# Patient Record
Sex: FEMALE | Race: WHITE | NOT HISPANIC OR LATINO | Employment: FULL TIME
[De-identification: names, ages, dates, MRNs, and addresses within clinical notes are randomized per-mention and may not be internally consistent; named-entity substitution may affect disease eponyms.]

---

## 2018-02-25 ENCOUNTER — HEALTH MAINTENANCE LETTER (OUTPATIENT)
Age: 38
End: 2018-02-25

## 2020-03-02 ENCOUNTER — HEALTH MAINTENANCE LETTER (OUTPATIENT)
Age: 40
End: 2020-03-02

## 2020-12-14 ENCOUNTER — HEALTH MAINTENANCE LETTER (OUTPATIENT)
Age: 40
End: 2020-12-14

## 2021-02-12 ASSESSMENT — ANXIETY QUESTIONNAIRES
5. BEING SO RESTLESS THAT IT IS HARD TO SIT STILL: NOT AT ALL
7. FEELING AFRAID AS IF SOMETHING AWFUL MIGHT HAPPEN: NOT AT ALL
3. WORRYING TOO MUCH ABOUT DIFFERENT THINGS: SEVERAL DAYS
GAD7 TOTAL SCORE: 3
4. TROUBLE RELAXING: SEVERAL DAYS
7. FEELING AFRAID AS IF SOMETHING AWFUL MIGHT HAPPEN: NOT AT ALL
2. NOT BEING ABLE TO STOP OR CONTROL WORRYING: NOT AT ALL
6. BECOMING EASILY ANNOYED OR IRRITABLE: SEVERAL DAYS
1. FEELING NERVOUS, ANXIOUS, OR ON EDGE: NOT AT ALL
GAD7 TOTAL SCORE: 3

## 2021-02-13 ASSESSMENT — ANXIETY QUESTIONNAIRES: GAD7 TOTAL SCORE: 3

## 2021-02-16 ENCOUNTER — OFFICE VISIT (OUTPATIENT)
Dept: OBGYN | Facility: CLINIC | Age: 41
End: 2021-02-16
Attending: NURSE PRACTITIONER
Payer: COMMERCIAL

## 2021-02-16 ENCOUNTER — ANCILLARY PROCEDURE (OUTPATIENT)
Dept: MAMMOGRAPHY | Facility: CLINIC | Age: 41
End: 2021-02-16
Attending: NURSE PRACTITIONER
Payer: COMMERCIAL

## 2021-02-16 VITALS
BODY MASS INDEX: 24.19 KG/M2 | SYSTOLIC BLOOD PRESSURE: 134 MMHG | WEIGHT: 145.2 LBS | HEART RATE: 71 BPM | HEIGHT: 65 IN | DIASTOLIC BLOOD PRESSURE: 83 MMHG

## 2021-02-16 DIAGNOSIS — Z13.220 SCREENING FOR LIPOID DISORDERS: ICD-10-CM

## 2021-02-16 DIAGNOSIS — Z30.431 INTRAUTERINE DEVICE SURVEILLANCE: ICD-10-CM

## 2021-02-16 DIAGNOSIS — Z12.31 VISIT FOR SCREENING MAMMOGRAM: ICD-10-CM

## 2021-02-16 DIAGNOSIS — Z80.3 FAMILY HISTORY OF MALIGNANT NEOPLASM OF BREAST: ICD-10-CM

## 2021-02-16 DIAGNOSIS — Z00.00 VISIT FOR PREVENTIVE HEALTH EXAMINATION: Primary | ICD-10-CM

## 2021-02-16 DIAGNOSIS — Z12.31 ENCOUNTER FOR SCREENING MAMMOGRAM FOR MALIGNANT NEOPLASM OF BREAST: ICD-10-CM

## 2021-02-16 DIAGNOSIS — Z12.4 SCREENING FOR MALIGNANT NEOPLASM OF CERVIX: ICD-10-CM

## 2021-02-16 DIAGNOSIS — Z01.419 ENCOUNTER FOR GYNECOLOGICAL EXAMINATION WITHOUT ABNORMAL FINDING: ICD-10-CM

## 2021-02-16 PROCEDURE — 87624 HPV HI-RISK TYP POOLED RSLT: CPT | Performed by: NURSE PRACTITIONER

## 2021-02-16 PROCEDURE — 90686 IIV4 VACC NO PRSV 0.5 ML IM: CPT

## 2021-02-16 PROCEDURE — G0008 ADMIN INFLUENZA VIRUS VAC: HCPCS

## 2021-02-16 PROCEDURE — 77067 SCR MAMMO BI INCL CAD: CPT

## 2021-02-16 PROCEDURE — G0145 SCR C/V CYTO,THINLAYER,RESCR: HCPCS | Performed by: NURSE PRACTITIONER

## 2021-02-16 PROCEDURE — 99386 PREV VISIT NEW AGE 40-64: CPT | Performed by: NURSE PRACTITIONER

## 2021-02-16 PROCEDURE — 77067 SCR MAMMO BI INCL CAD: CPT | Mod: 26 | Performed by: RADIOLOGY

## 2021-02-16 PROCEDURE — 250N000011 HC RX IP 250 OP 636

## 2021-02-16 ASSESSMENT — ENCOUNTER SYMPTOMS
CHILLS: 0
INCREASED ENERGY: 0
HOT FLASHES: 0
HALLUCINATIONS: 0
SPUTUM PRODUCTION: 0
ARTHRALGIAS: 0
BREAST PAIN: 0
TROUBLE SWALLOWING: 0
FATIGUE: 0
WEIGHT GAIN: 0
WEIGHT LOSS: 0
BOWEL INCONTINENCE: 0
DOUBLE VISION: 0
SNORES LOUDLY: 0
EYE IRRITATION: 0
DIFFICULTY URINATING: 0
LEG PAIN: 0
BLOOD IN STOOL: 0
HEMATURIA: 0
POSTURAL DYSPNEA: 0
ABDOMINAL PAIN: 0
DECREASED CONCENTRATION: 0
POLYPHAGIA: 0
LIGHT-HEADEDNESS: 0
RECTAL BLEEDING: 0
EYE PAIN: 0
PANIC: 0
FEVER: 0
SORE THROAT: 0
RESPIRATORY PAIN: 0
EYE REDNESS: 0
LEG SWELLING: 0
NECK MASS: 0
DYSPNEA ON EXERTION: 0
NECK PAIN: 0
SINUS CONGESTION: 0
FLANK PAIN: 0
NAUSEA: 0
SPEECH CHANGE: 0
HOARSE VOICE: 0
HEARTBURN: 0
SMELL DISTURBANCE: 0
SKIN CHANGES: 0
DIARRHEA: 0
WEAKNESS: 0
WHEEZING: 0
LOSS OF CONSCIOUSNESS: 0
HEADACHES: 0
TREMORS: 0
MYALGIAS: 0
EXTREMITY NUMBNESS: 0
EYE WATERING: 0
CONSTIPATION: 0
DECREASED APPETITE: 0
ORTHOPNEA: 0
SHORTNESS OF BREATH: 0
COUGH DISTURBING SLEEP: 0
EXERCISE INTOLERANCE: 0
PARALYSIS: 0
CLAUDICATION: 0
TASTE DISTURBANCE: 0
HYPOTENSION: 0
HYPERTENSION: 0
SEIZURES: 0
SYNCOPE: 0
NUMBNESS: 0
POLYDIPSIA: 0
ALTERED TEMPERATURE REGULATION: 0
MUSCLE WEAKNESS: 0
HEMOPTYSIS: 0
SLEEP DISTURBANCES DUE TO BREATHING: 0
BACK PAIN: 0
STIFFNESS: 0
TINGLING: 0
COUGH: 0
MEMORY LOSS: 0
DEPRESSION: 0
BLOATING: 0
NIGHT SWEATS: 0
JAUNDICE: 0
DISTURBANCES IN COORDINATION: 0
JOINT SWELLING: 0
TACHYCARDIA: 0
VOMITING: 0
NAIL CHANGES: 0
INSOMNIA: 0
NERVOUS/ANXIOUS: 0
BREAST MASS: 0
SINUS PAIN: 0
PALPITATIONS: 0
DYSURIA: 0
DECREASED LIBIDO: 0
POOR WOUND HEALING: 0
DIZZINESS: 0
SWOLLEN GLANDS: 0
MUSCLE CRAMPS: 0
RECTAL PAIN: 0
BRUISES/BLEEDS EASILY: 0

## 2021-02-16 ASSESSMENT — PATIENT HEALTH QUESTIONNAIRE - PHQ9: SUM OF ALL RESPONSES TO PHQ QUESTIONS 1-9: 2

## 2021-02-16 ASSESSMENT — MIFFLIN-ST. JEOR: SCORE: 1319.5

## 2021-02-16 NOTE — PROGRESS NOTES
Annual visit : preventive health, check in.    Concerns: would like to stay on routine preventive health visit check-up.     Mammogram: would like to get it done today if appt available.   -mother diagnosed with breast cancer at the age of 50.   -aunt (father sister) had ovarian cancer stage 4    Curious about BRCA 1 & 2 genetic testing.   Medications: no medications. Drinks herbal tea.     Last pap: 12/22/2014?  Yes to doing pap smear     Declines STI testing.     IUD Mirena: 10/20/2016     Labs: lipids, thyroid, glucose?   -Doesn't like blood draws. Would be fine with doing it.     Sexual history: Sexually active. One partner in the last 10 years () male partner. No concerns with pain or changes in libido.     Social: , works from home. Lives with  and 2 children.     Diet: eats a lot of home, more starch than would like.  is  so eats balanced. Gets three serving of calcium.     Exercise: cross country skiinging, jog, walks dog 2x day.     Immunizations: unsure.   Flu shot: would like to get it today.

## 2021-02-16 NOTE — PATIENT INSTRUCTIONS

## 2021-02-16 NOTE — PROGRESS NOTES
Progress Note    SUBJECTIVE:  Nisreen Mohan is an 40 year old, , who requests an Annual Preventive Exam.     Concerns today include:   1. Preventative Health Visit: Nisreen had her last preventive exam in . She had her last cervical cancer screening (Pap Smear) on 2014 NIL, HPV negative with no history of abnormal pap smears. She reports she would like to have pap smear today.   She reports she has never had cholesterol level checked. She has a family history of cardiovascular disease (parents and grandparents). She is agreeable to having lipids checked today.     2. Mammogram: She has never had a mammogram. Her mother was diagnosed with breast cancer in her early 50s. She would like to have a mammogram today.     3. Genetic testing: Nisreen has a significant family history of cancer (mother- breast cancer; paternal aunt had stage 4 ovarian cancer, stage 4; father had prostate and rectal cancer).  She is interested in discussing BRCA 1&2 genetic testing    4. Sexual Health: Sexually active with 1 male partner in the last 10 yrs (). Denies sexual concerns.     Contraception: Mirena IUD placed placed 10/20/2016. Reports satisfaction with this form of contracpetion. Dos not desire to have any more children.     Social: works as an ; currently works from home. Lives with  and 2 children, ages 4 and 8.     Diet: eats balanced, home cooked meals.  is a . Gets three serving of calcium, mostly cheese.    Exercise: reports she is very active; cross country skiinging, jogs, walks dog 2x day.       Menstrual History:  Menstrual History 2016   LAST MENSTRUAL PERIOD 2015 - 2021   Menarche Age - 12 -   Period Cycle (Days) - 28-30 -   Period Duration (Days) - after copper iud  4days cycle -   Method of Contraception - - -   Period Pattern - Regular -   Menstrual Flow - Moderate -   Menstrual Control - - -   Dysmenorrhea - None -   PMS Symptoms - - -    Reviewed Today - Yes -         HISTORY:       levonorgestrel (MIRENA) 20 MCG/24HR IUD, 1 each by Intrauterine route once    No current facility-administered medications on file prior to visit.     No Known Allergies  Immunization History   Administered Date(s) Administered     Influenza (IIV3) PF 2012     Influenza Vaccine IM > 6 months Valent IIV4 10/20/2016     TDAP Vaccine (Boostrix) 11/15/2012, 2016     Flu shot: would like to get it today    OB History    Para Term  AB Living   3 2 2 0 1 2   SAB TAB Ectopic Multiple Live Births   0 0 0 0 2     Past Medical History:   Diagnosis Date     Menarche age    Cycles Q     Uncomplicated asthma      Past Surgical History:   Procedure Laterality Date     HC TOOTH EXTRACTION W/FORCEP      no troubles with sedation     Family History   Problem Relation Age of Onset     Breast Cancer Mother 51        lymphectomy on right side     Cancer Father 63        colon, rectal     Hypertension Father         takes medication     Prostate Cancer Father      Cardiovascular Maternal Grandmother         quad bypass     Cerebrovascular Disease Paternal Grandfather      Ovarian Cancer Paternal Aunt 68        stage 4     Other Cancer Other         Stage 4 Ovarian Cancer     Social History     Socioeconomic History     Marital status:      Spouse name: Grayson     Number of children: 1     Years of education: None     Highest education level: None   Occupational History     Occupation:      Comment: full time   Social Needs     Financial resource strain: None     Food insecurity     Worry: None     Inability: None     Transportation needs     Medical: None     Non-medical: None   Tobacco Use     Smoking status: Never Smoker     Smokeless tobacco: Never Used   Substance and Sexual Activity     Alcohol use: Yes     Alcohol/week: 2.0 - 3.0 standard drinks     Comment: 1 beer or wine per day 3-5 nights a week     Drug use: No     Sexual activity: Yes      Partners: Male     Birth control/protection: I.U.D., None     Comment: Paragard   Lifestyle     Physical activity     Days per week: None     Minutes per session: None     Stress: None   Relationships     Social connections     Talks on phone: None     Gets together: None     Attends [employer] service: None     Active member of club or organization: None     Attends meetings of clubs or organizations: None     Relationship status: None     Intimate partner violence     Fear of current or ex partner: None     Emotionally abused: None     Physically abused: None     Forced sexual activity: None   Other Topics Concern     Parent/sibling w/ CABG, MI or angioplasty before 65F 55M? Not Asked      Service No     Blood Transfusions No     Caffeine Concern No     Occupational Exposure No     Hobby Hazards No     Sleep Concern Yes     Comment: trouble staying asleep, taking sleep      Stress Concern Yes     Comment: new job, considering marriage, not feeling overwhelmed     Weight Concern No     Special Diet No     Back Care No     Exercise Yes     Comment: runner, cross-fit       Bike Helmet Yes     Seat Belt Yes     Self-Exams Yes   Social History Narrative    How much exercise per week? 3 days    How much calcium per day? 4-5 servings       How much caffeine per day? 1 cup coffee    How much vitamin D per day?  No supplements    Do you/your family wear seatbelts?  Yes    Do you/your family use safety helmets? Yes    Do you/your family use sunscreen? Yes    Do you/your family keep firearms in the home? No    Do you/your family have a smoke detector(s)? Yes        Do you feel safe in your home? Yes    Has anyone ever touched you in an unwanted manner? No     Explain         December 22, 2014 Kaleb Betancourt LPN    reveiewed cmcjaime moreno lpn 1-           Review of Systems     Constitutional:  Negative for fever, chills, weight loss, weight gain, fatigue, decreased appetite, night sweats, recent stressors,  height gain, height loss, post-operative complications, incisional pain, hallucinations, increased energy, hyperactivity and confused.   HENT:  Negative for ear pain, hearing loss, tinnitus, nosebleeds, trouble swallowing, hoarse voice, mouth sores, sore throat, ear discharge, tooth pain, gum tenderness, taste disturbance, smell disturbance, hearing aid, bleeding gums, dry mouth, sinus pain, sinus congestion and neck mass.    Eyes:  Negative for double vision, pain, redness, eye pain, decreased vision, eye watering, eye bulging, eye dryness, flashing lights, spots, floaters, strabismus, tunnel vision, jaundice and eye irritation.   Respiratory:   Negative for cough, hemoptysis, sputum production, shortness of breath, wheezing, sleep disturbances due to breathing, snores loudly, respiratory pain, dyspnea on exertion, cough disturbing sleep and postural dyspnea.    Cardiovascular:  Negative for chest pain, dyspnea on exertion, palpitations, orthopnea, claudication, leg swelling, fingers/toes turn blue, hypertension, hypotension, syncope, history of heart murmur, chest pain on exertion, chest pain at rest, pacemaker, few scattered varicosities, leg pain, sleep disturbances due to breathing, tachycardia, light-headedness, exercise intolerance and edema.   Gastrointestinal:  Negative for heartburn, nausea, vomiting, abdominal pain, diarrhea, constipation, blood in stool, melena, rectal pain, bloating, hemorrhoids, bowel incontinence, jaundice, rectal bleeding, coffee ground emesis and change in stool.   Genitourinary:  Negative for bladder incontinence, dysuria, urgency, hematuria, flank pain, vaginal discharge, difficulty urinating, genital sores, dyspareunia, decreased libido, nocturia, voiding less frequently, arousal difficulty, abnormal vaginal bleeding, excessive menstruation, menstrual changes, hot flashes, vaginal dryness and postmenopausal bleeding.   Musculoskeletal:  Negative for myalgias, back pain, joint  "swelling, arthralgias, stiffness, muscle cramps, neck pain, bone pain, muscle weakness and fracture.   Skin:  Negative for nail changes, itching, poor wound healing, rash, hair changes, skin changes, acne, warts, poor wound healing, scarring, flaky skin, Raynaud's phenomenon, sensitivity to sunlight and skin thickening.   Neurological:  Negative for dizziness, tingling, tremors, speech change, seizures, loss of consciousness, weakness, light-headedness, numbness, headaches, disturbances in coordination, extremity numbness, memory loss, difficulty walking and paralysis.   Endo/Heme:  Negative for anemia, swollen glands and bruises/bleeds easily.   Psychiatric/Behavioral:  Negative for depression, hallucinations, memory loss, decreased concentration, mood swings and panic attacks.    Breast:  Negative for breast discharge, breast mass, breast pain and nipple retraction.   Endocrine:  Negative for altered temperature regulation, polyphagia, polydipsia, unwanted hair growth and change in facial hair.      Answers for HPI/ROS submitted by the patient on 2/12/2021   YINA 7 TOTAL SCORE: 3  General Symptoms: No  Skin Symptoms: No  HENT Symptoms: No  EYE SYMPTOMS: No  HEART SYMPTOMS: No  LUNG SYMPTOMS: No  INTESTINAL SYMPTOMS: No  URINARY SYMPTOMS: No  GYNECOLOGIC SYMPTOMS: No  BREAST SYMPTOMS: No  SKELETAL SYMPTOMS: No  BLOOD SYMPTOMS: No  NERVOUS SYSTEM SYMPTOMS: No  MENTAL HEALTH SYMPTOMS: No    PHQ-9 SCORE 2/16/2016 8/10/2016 10/20/2016   PHQ-9 Total Score MyChart 16 (Moderately severe depression) - -   PHQ-9 Total Score - 2 1     YINA-7 SCORE 2/16/2016 2/12/2021   Total Score 8 (mild anxiety) 3 (minimal anxiety)   Total Score - 3         EXAM:  Blood pressure 134/83, pulse 71, height 1.651 m (5' 5\"), weight 65.9 kg (145 lb 3.2 oz), last menstrual period 02/08/2021, unknown if currently breastfeeding. Body mass index is 24.16 kg/m .  General - pleasant female in no acute distress.  Skin - no suspicious lesions or " rashes  EENT-  euthyroid with out palpable nodules  Neck - supple without lymphadenopathy.  Lungs - clear to auscultation bilaterally.  Heart - regular rate and rhythm without murmur.  Abdomen - soft, nontender, nondistended, no masses or organomegaly noted.  Musculoskeletal - no gross deformities.  Neurological - normal strength, sensation, and mental status.    Breast Exam:  Breast: Without visible skin changes. No dimpling or lesions seen.   Breasts supple, non-tender with palpation, no dominant mass, nodularity, or nipple discharge noted bilaterally. Axillary nodes negative.      Pelvic Exam:  EG/BUS: Normal genital architecture without lesions, erythema or abnormal secretions Bartholin's, Urethra, Winslow's normal   Urethral meatus: normal   Urethra: no masses, tenderness, or scarring   Bladder: no masses or tenderness   Vagina: moist, pink, rugae with creamy, white and odorless, physiological discharge. Cyst present on the right vaginal wall.   Cervix: pink, moist, closed, without lesion or CMT and IUD strings extend less than 1 cm from external os.  Uterus: anteverted,   Adnexa: Within normal limits and No masses, nodularity, tenderness        ASSESSMENT:  Encounter Diagnoses   Name Primary?     Visit for preventive health examination      Screening for lipoid disorders      Encounter for screening mammogram for malignant neoplasm of breast      Screening for malignant neoplasm of cervix      Encounter for gynecological examination without abnormal finding      Family history of malignant neoplasm of breast Yes     Intrauterine device surveillance         PLAN:   Orders Placed This Encounter   Procedures     Pelvic and Breast Exam Procedure []     Pap Smear Exam [] Do Not Remove     Pap imaged thin layer screen with HPV - recommended age 30 - 65 years (select HPV order below)     HPV High Risk Types DNA Cervical     Lipid panel reflex to direct LDL Fasting     CANCER RISK MGMT/CANCER GENETIC  COUNSELING REFERRAL     Orders Placed This Encounter   Medications     levonorgestrel (MIRENA) 20 MCG/24HR IUD     Si each by Intrauterine route once     Preventative Health:  - Flu vaccine administered today  - Pap smear with HPV testing completed today  - First mammogram completed today following this visit  - Colonoscopy n/a  - Lipid profile ordered today    Family hx of breast cancer:  - Referral placed for genetic counseling to discuss options for genetic screening and whether or not patient qualifies for high risk breast cancer screening with MRI and mammogram.     IUD surveillance:  - IUD strings palpable on exam; IUD is due for removal/ replacement by 10/20/2022.    Additional teaching done at this visit regarding calcium (1200 mg per day), self breast exam and mental health.    Return to clinic in one year.  Follow-up as needed.    I, Dominique Islas, completed the PFSH and ROS. I then acted as a scribe for XIOMARA Wynne, for the remainder of the visit.  Dominique Islas DNP, RN, PHN, WHNP student     I agree with the PFSH and ROS as completed by the XIOMARA Student, except for changes made by me.  The remainder of the encounter was performed by me and scribed by the XIOMARA Student.  The scribed note accurately reflects my personal services and decisions made by me.  Philomena Ochoa DNP, APRN, XIOMARA

## 2021-02-16 NOTE — LETTER
2021       RE: Nisreen Mohan  [address] [facility] [address]     Dear Colleague,    Thank you for referring your patient, Nisreen Mohan, to the [facility] at . Please see a copy of my visit note below.    Progress Note    SUBJECTIVE:  Nisreen Mohan is an 40 year old, , who requests an Annual Preventive Exam.     Concerns today include:   1. Preventative Health Visit: Nisreen had her last preventive exam in . She had her last cervical cancer screening (Pap Smear) on 2014 NIL, HPV negative with no history of abnormal pap smears. She reports she would like to have pap smear today.   She reports she has never had cholesterol level checked. She has a family history of cardiovascular disease (parents and grandparents). She is agreeable to having lipids checked today.     2. Mammogram: She has never had a mammogram. Her mother was diagnosed with breast cancer in her early 50s. She would like to have a mammogram today.     3. Genetic testing: Nisreen has a significant family history of cancer (mother- breast cancer; paternal aunt had stage 4 ovarian cancer, stage 4; father had prostate and rectal cancer).  She is interested in discussing BRCA 1&2 genetic testing    4. Sexual Health: Sexually active with 1 male partner in the last 10 yrs (). Denies sexual concerns.     Contraception: Mirena IUD placed placed 10/20/2016. Reports satisfaction with this form of contracpetion. Dos not desire to have any more children.     Social: works as an ; currently works from home. Lives with  and 2 children, ages 4 and 8.     Diet: eats balanced, home cooked meals.  is a . Gets three serving of calcium, mostly cheese.    Exercise: reports she is very active; cross country skiinging, jogs, walks dog 2x day.       Menstrual History:  Menstrual History 2016   LAST  MENSTRUAL PERIOD 2015 - 2021   Menarche Age - 12 -   Period Cycle (Days) - 28-30 -   Period Duration (Days) - after copper iud  4days cycle -   Method of Contraception - - -   Period Pattern - Regular -   Menstrual Flow - Moderate -   Menstrual Control - - -   Dysmenorrhea - None -   PMS Symptoms - - -   Reviewed Today - Yes -         HISTORY:       levonorgestrel (MIRENA) 20 MCG/24HR IUD, 1 each by Intrauterine route once    No current facility-administered medications on file prior to visit.     No Known Allergies  Immunization History   Administered Date(s) Administered     Influenza (IIV3) PF 2012     Influenza Vaccine IM > 6 months Valent IIV4 10/20/2016     TDAP Vaccine (Boostrix) 11/15/2012, 2016     Flu shot: would like to get it today    OB History    Para Term  AB Living   3 2 2 0 1 2   SAB TAB Ectopic Multiple Live Births   0 0 0 0 2     Past Medical History:   Diagnosis Date     Menarche age    Cycles Q     Uncomplicated asthma      Past Surgical History:   Procedure Laterality Date     HC TOOTH EXTRACTION W/FORCEP      no troubles with sedation     Family History   Problem Relation Age of Onset     Breast Cancer Mother 51        lymphectomy on right side     Cancer Father 63        colon, rectal     Hypertension Father         takes medication     Prostate Cancer Father      Cardiovascular Maternal Grandmother         quad bypass     Cerebrovascular Disease Paternal Grandfather      Ovarian Cancer Paternal Aunt 68        stage 4     Other Cancer Other         Stage 4 Ovarian Cancer     Social History     Socioeconomic History     Marital status:      Spouse name: Grayson     Number of children: 1     Years of education: None     Highest education level: None   Occupational History     Occupation:      Comment: full time   Social Needs     Financial resource strain: None     Food insecurity     Worry: None     Inability: None     Transportation needs      Medical: None     Non-medical: None   Tobacco Use     Smoking status: Never Smoker     Smokeless tobacco: Never Used   Substance and Sexual Activity     Alcohol use: Yes     Alcohol/week: 2.0 - 3.0 standard drinks     Comment: 1 beer or wine per day 3-5 nights a week     Drug use: No     Sexual activity: Yes     Partners: Male     Birth control/protection: I.U.D., None     Comment: Paragard   Lifestyle     Physical activity     Days per week: None     Minutes per session: None     Stress: None   Relationships     Social connections     Talks on phone: None     Gets together: None     Attends Caodaism service: None     Active member of club or organization: None     Attends meetings of clubs or organizations: None     Relationship status: None     Intimate partner violence     Fear of current or ex partner: None     Emotionally abused: None     Physically abused: None     Forced sexual activity: None   Other Topics Concern     Parent/sibling w/ CABG, MI or angioplasty before 65F 55M? Not Asked      Service No     Blood Transfusions No     Caffeine Concern No     Occupational Exposure No     Hobby Hazards No     Sleep Concern Yes     Comment: trouble staying asleep, taking sleep      Stress Concern Yes     Comment: new job, considering marriage, not feeling overwhelmed     Weight Concern No     Special Diet No     Back Care No     Exercise Yes     Comment: runner, cross-fit       Bike Helmet Yes     Seat Belt Yes     Self-Exams Yes   Social History Narrative    How much exercise per week? 3 days    How much calcium per day? 4-5 servings       How much caffeine per day? 1 cup coffee    How much vitamin D per day?  No supplements    Do you/your family wear seatbelts?  Yes    Do you/your family use safety helmets? Yes    Do you/your family use sunscreen? Yes    Do you/your family keep firearms in the home? No    Do you/your family have a smoke detector(s)? Yes        Do you feel safe in your home? Yes     Has anyone ever touched you in an unwanted manner? No     Explain         December 22, 2014 Kaleb Betancourt PORTILLON    reveiewed cmcmki m christine 1-           Review of Systems     Constitutional:  Negative for fever, chills, weight loss, weight gain, fatigue, decreased appetite, night sweats, recent stressors, height gain, height loss, post-operative complications, incisional pain, hallucinations, increased energy, hyperactivity and confused.   HENT:  Negative for ear pain, hearing loss, tinnitus, nosebleeds, trouble swallowing, hoarse voice, mouth sores, sore throat, ear discharge, tooth pain, gum tenderness, taste disturbance, smell disturbance, hearing aid, bleeding gums, dry mouth, sinus pain, sinus congestion and neck mass.    Eyes:  Negative for double vision, pain, redness, eye pain, decreased vision, eye watering, eye bulging, eye dryness, flashing lights, spots, floaters, strabismus, tunnel vision, jaundice and eye irritation.   Respiratory:   Negative for cough, hemoptysis, sputum production, shortness of breath, wheezing, sleep disturbances due to breathing, snores loudly, respiratory pain, dyspnea on exertion, cough disturbing sleep and postural dyspnea.    Cardiovascular:  Negative for chest pain, dyspnea on exertion, palpitations, orthopnea, claudication, leg swelling, fingers/toes turn blue, hypertension, hypotension, syncope, history of heart murmur, chest pain on exertion, chest pain at rest, pacemaker, few scattered varicosities, leg pain, sleep disturbances due to breathing, tachycardia, light-headedness, exercise intolerance and edema.   Gastrointestinal:  Negative for heartburn, nausea, vomiting, abdominal pain, diarrhea, constipation, blood in stool, melena, rectal pain, bloating, hemorrhoids, bowel incontinence, jaundice, rectal bleeding, coffee ground emesis and change in stool.   Genitourinary:  Negative for bladder incontinence, dysuria, urgency, hematuria, flank pain, vaginal discharge,  difficulty urinating, genital sores, dyspareunia, decreased libido, nocturia, voiding less frequently, arousal difficulty, abnormal vaginal bleeding, excessive menstruation, menstrual changes, hot flashes, vaginal dryness and postmenopausal bleeding.   Musculoskeletal:  Negative for myalgias, back pain, joint swelling, arthralgias, stiffness, muscle cramps, neck pain, bone pain, muscle weakness and fracture.   Skin:  Negative for nail changes, itching, poor wound healing, rash, hair changes, skin changes, acne, warts, poor wound healing, scarring, flaky skin, Raynaud's phenomenon, sensitivity to sunlight and skin thickening.   Neurological:  Negative for dizziness, tingling, tremors, speech change, seizures, loss of consciousness, weakness, light-headedness, numbness, headaches, disturbances in coordination, extremity numbness, memory loss, difficulty walking and paralysis.   Endo/Heme:  Negative for anemia, swollen glands and bruises/bleeds easily.   Psychiatric/Behavioral:  Negative for depression, hallucinations, memory loss, decreased concentration, mood swings and panic attacks.    Breast:  Negative for breast discharge, breast mass, breast pain and nipple retraction.   Endocrine:  Negative for altered temperature regulation, polyphagia, polydipsia, unwanted hair growth and change in facial hair.      Answers for HPI/ROS submitted by the patient on 2/12/2021   YINA 7 TOTAL SCORE: 3  General Symptoms: No  Skin Symptoms: No  HENT Symptoms: No  EYE SYMPTOMS: No  HEART SYMPTOMS: No  LUNG SYMPTOMS: No  INTESTINAL SYMPTOMS: No  URINARY SYMPTOMS: No  GYNECOLOGIC SYMPTOMS: No  BREAST SYMPTOMS: No  SKELETAL SYMPTOMS: No  BLOOD SYMPTOMS: No  NERVOUS SYSTEM SYMPTOMS: No  MENTAL HEALTH SYMPTOMS: No    PHQ-9 SCORE 2/16/2016 8/10/2016 10/20/2016   PHQ-9 Total Score MyChart 16 (Moderately severe depression) - -   PHQ-9 Total Score - 2 1     YINA-7 SCORE 2/16/2016 2/12/2021   Total Score 8 (mild anxiety) 3 (minimal anxiety)  "  Total Score - 3         EXAM:  Blood pressure 134/83, pulse 71, height 1.651 m (5' 5\"), weight 65.9 kg (145 lb 3.2 oz), last menstrual period 02/08/2021, unknown if currently breastfeeding. Body mass index is 24.16 kg/m .  General - pleasant female in no acute distress.  Skin - no suspicious lesions or rashes  EENT-  euthyroid with out palpable nodules  Neck - supple without lymphadenopathy.  Lungs - clear to auscultation bilaterally.  Heart - regular rate and rhythm without murmur.  Abdomen - soft, nontender, nondistended, no masses or organomegaly noted.  Musculoskeletal - no gross deformities.  Neurological - normal strength, sensation, and mental status.    Breast Exam:  Breast: Without visible skin changes. No dimpling or lesions seen.   Breasts supple, non-tender with palpation, no dominant mass, nodularity, or nipple discharge noted bilaterally. Axillary nodes negative.      Pelvic Exam:  EG/BUS: Normal genital architecture without lesions, erythema or abnormal secretions Bartholin's, Urethra, Grenora's normal   Urethral meatus: normal   Urethra: no masses, tenderness, or scarring   Bladder: no masses or tenderness   Vagina: moist, pink, rugae with creamy, white and odorless, physiological discharge. Cyst present on the right vaginal wall.   Cervix: pink, moist, closed, without lesion or CMT and IUD strings extend less than 1 cm from external os.  Uterus: anteverted,   Adnexa: Within normal limits and No masses, nodularity, tenderness        ASSESSMENT:  Encounter Diagnoses   Name Primary?     Visit for preventive health examination      Screening for lipoid disorders      Encounter for screening mammogram for malignant neoplasm of breast      Screening for malignant neoplasm of cervix      Encounter for gynecological examination without abnormal finding      Family history of malignant neoplasm of breast Yes     Intrauterine device surveillance         PLAN:   Orders Placed This Encounter   Procedures     " Pelvic and Breast Exam Procedure []     Pap Smear Exam [] Do Not Remove     Pap imaged thin layer screen with HPV - recommended age 30 - 65 years (select HPV order below)     HPV High Risk Types DNA Cervical     Lipid panel reflex to direct LDL Fasting     CANCER RISK MGMT/CANCER GENETIC COUNSELING REFERRAL     Orders Placed This Encounter   Medications     levonorgestrel (MIRENA) 20 MCG/24HR IUD     Si each by Intrauterine route once     Preventative Health:  - Flu vaccine administered today  - Pap smear with HPV testing completed today  - First mammogram completed today following this visit  - Colonoscopy n/a  - Lipid profile ordered today    Family hx of breast cancer:  - Referral placed for genetic counseling to discuss options for genetic screening and whether or not patient qualifies for high risk breast cancer screening with MRI and mammogram.     IUD surveillance:  - IUD strings palpable on exam; IUD is due for removal/ replacement by 10/20/2022.    Additional teaching done at this visit regarding calcium (1200 mg per day), self breast exam and mental health.    Return to clinic in one year.  Follow-up as needed.    I, Dominique Islas, completed the PFSH and ROS. I then acted as a scribe for XIOMARA Wynne, for the remainder of the visit.  Dominique Islas DNP, RN, PHN, NP student     I agree with the PFSH and ROS as completed by the XIOMARA Student, except for changes made by me.  The remainder of the encounter was performed by me and scribed by the XIOMARA Student.  The scribed note accurately reflects my personal services and decisions made by me.  Philomena Ochoa DNP, APRN, XIOMARA

## 2021-02-19 LAB
COPATH REPORT: NORMAL
PAP: NORMAL

## 2021-02-22 LAB
FINAL DIAGNOSIS: NORMAL
HPV HR 12 DNA CVX QL NAA+PROBE: NEGATIVE
HPV16 DNA SPEC QL NAA+PROBE: NEGATIVE
HPV18 DNA SPEC QL NAA+PROBE: NEGATIVE
SPECIMEN DESCRIPTION: NORMAL
SPECIMEN SOURCE CVX/VAG CYTO: NORMAL

## 2021-10-02 ENCOUNTER — HEALTH MAINTENANCE LETTER (OUTPATIENT)
Age: 41
End: 2021-10-02

## 2022-03-19 ENCOUNTER — HEALTH MAINTENANCE LETTER (OUTPATIENT)
Age: 42
End: 2022-03-19

## 2022-04-07 ENCOUNTER — ANCILLARY PROCEDURE (OUTPATIENT)
Dept: MAMMOGRAPHY | Facility: CLINIC | Age: 42
End: 2022-04-07
Attending: NURSE PRACTITIONER
Payer: COMMERCIAL

## 2022-04-07 DIAGNOSIS — Z12.31 VISIT FOR SCREENING MAMMOGRAM: ICD-10-CM

## 2022-04-07 PROCEDURE — 77067 SCR MAMMO BI INCL CAD: CPT

## 2022-04-07 PROCEDURE — 77067 SCR MAMMO BI INCL CAD: CPT | Mod: 26 | Performed by: STUDENT IN AN ORGANIZED HEALTH CARE EDUCATION/TRAINING PROGRAM

## 2022-07-13 ENCOUNTER — MYC MEDICAL ADVICE (OUTPATIENT)
Dept: OBGYN | Facility: CLINIC | Age: 42
End: 2022-07-13

## 2022-09-03 ENCOUNTER — HEALTH MAINTENANCE LETTER (OUTPATIENT)
Age: 42
End: 2022-09-03

## 2023-04-29 ENCOUNTER — HEALTH MAINTENANCE LETTER (OUTPATIENT)
Age: 43
End: 2023-04-29

## 2023-07-22 ENCOUNTER — HEALTH MAINTENANCE LETTER (OUTPATIENT)
Age: 43
End: 2023-07-22

## 2023-08-23 NOTE — PROGRESS NOTES
"  Progress Note    SUBJECTIVE:  Nisreen Mohan is an 43 year old, , who requests an Annual Preventive Exam.     Concerns today include:    Mental health: Had a panic attck in April while at work triggered by a difficult work situation and the way it was handled. She had lost her dad in February, who she was very close to.  Started therapy in 2023 through [facility]. Then lost her father-in law in May. Her husand lost his job recently as well.  Started Zoloft 50mg daily; has been on this dose since May 2023.  Has felt a marked difference - had been ruminating on things, couldn't sleep, felt like she was \"moving through melases\".  Lost her job due to having a panic attack at work and is current challenging this legally. Has since started a new job and is finding it to be a positive and supportive environment. 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 Requests paperwork to be completed that indicates she is able to return to work at this time.     2. Father had colorectal cancer - he  in January of sudden death. Patient has not had colon cancer screening yet.     3. Mother diagnosed with breast cancer in her early 50's: Last mammogram: 2022. She would like to have a mammogram today.      Genetic testing: Nisreen has a significant family history of cancer (mother- breast cancer; paternal aunt had stage 4 ovarian cancer;  father had prostate and colorectal cancer).  Her sister did genetic testing and it was negative.  She is interested in discussing BRCA 1&2 genetic testing.      Contraception: Mirena IUD placed placed 10/20/2016 (due for removal 10/2024). Reports satisfaction with this form of contracpetion. Dos not desire to have any more children.   - Getting light periods.      Social: works as an ; currently works from home. Lives with  and 2 children.  Drinking alcohol several times per week, feels that she can control amount of drinking but that it has started to become somewhat " habitual and part of her coping mechanisms.      Diet: eats balanced, home cooked meals.  is a . Gets three serving of calcium, mostly cheese.     Exercise: trying to get back into exercise routine  Alcohol: 1-2 daily    Lipid profile: DUE    Last pap smear: 2/2021 NIL, HPV negative    Menstrual History:      2/16/2021    10:00 AM 8/24/2023    10:30 AM 8/24/2023    10:37 AM   Menstrual History   LAST MENSTRUAL PERIOD 2/8/2021 8/7/2023    Period Cycle (Days)   15   Period Duration (Days)   5   Method of Contraception   Copper IUD   Period Pattern   Regular   Menstrual Flow   Light   Dysmenorrhea   None   Reviewed Today   Yes     Last Mammogram:   MA Screening Digital Bilateral    Result Date: 8/28/2023  Narrative: BILATERAL FULL FIELD DIGITAL SCREENING MAMMOGRAM Performed on: 8/28/23 Compared to: 04/07/2022 and 02/16/2021 Technique: This study was evaluated with the assistance of Computer-Aided Detection. Findings: The breasts are extremely dense, which lowers the sensitivity of mammography.  There is no radiographic evidence of malignancy.     MA Screening Digital Bilateral    Result Date: 4/7/2022  Narrative: BILATERAL FULL FIELD DIGITAL SCREENING MAMMOGRAM Performed on: 4/7/22 Compared to: 02/16/2021 Technique: This study was evaluated with the assistance of Computer-Aided Detection. Findings: The breasts are extremely dense, which lowers the sensitivity of mammography.  There is no radiographic evidence of malignancy. IMPRESSION: ACR BI-RADS Category 1: Negative RECOMMENDED FOLLOW-UP: Annual routine screening mammogram The results and recommendations of this examination will be communicated to the patient.     MA Screening Digital Bilateral    Result Date: 2/17/2021  Narrative: EXAMINATION:  MA SCREENING DIGITAL BILATERAL, 2/16/2021 11:15 AM HISTORY/FAMILY HISTORY: No current or new breast symptoms, screening. Family history for breast cancer in mother, diagnosed in her mid 50's.  Family history of  ovarian cancer in aunt, diagnosed in her mid 60's. COMPARISON: None, baseline TECHNIQUE: Standard mammographic views were performed FINDINGS: BREAST DENSITY: Extremely dense. There are no suspicious findings in either breast.     HISTORY:  levonorgestrel (MIRENA) 20 MCG/24HR IUD, 1 each by Intrauterine route once    No current facility-administered medications on file prior to visit.    No Known Allergies  Immunization History   Administered Date(s) Administered    COVID-19 Bivalent 12+ (Pfizer) 2022    COVID-19 MONOVALENT 12+ (Pfizer) 2021, 2021, 2021    Influenza (IIV3) PF 2012    Influenza Vaccine >6 months (Alfuria,Fluzone) 10/20/2016, 2021    TDAP Vaccine (Boostrix) 11/15/2012, 2016     OB History    Para Term  AB Living   3 2 2 0 1 2   SAB IAB Ectopic Multiple Live Births   0 0 0 0 2     Past Medical History:   Diagnosis Date    Anxiety     Depression     Menarche age    Cycles Q    Uncomplicated asthma      Past Surgical History:   Procedure Laterality Date    HC TOOTH EXTRACTION W/FORCEP      no troubles with sedation     Family History   Problem Relation Age of Onset    Breast Cancer Mother         lymphectomy on right side    Cancer Father 63        colon, rectal    Hypertension Father         takes medication    Prostate Cancer Father     Cardiovascular Maternal Grandmother         quad bypass    Cerebrovascular Disease Paternal Grandfather     Ovarian Cancer Paternal Aunt 68        stage 4    Other Cancer Other         Stage 4 Ovarian Cancer     Social History     Socioeconomic History    Marital status:      Spouse name: Grayson    Number of children: 1    Years of education: None    Highest education level: None   Occupational History    Occupation:      Comment: full time   Tobacco Use    Smoking status: Never    Smokeless tobacco: Never   Vaping Use    Vaping Use: Never used   Substance and Sexual Activity    Alcohol use: Yes      Alcohol/week: 2.0 - 3.0 standard drinks of alcohol     Comment: 1 beer or wine per day 3-5 nights a week    Drug use: No    Sexual activity: Yes     Partners: Male     Birth control/protection: I.U.D., None     Comment: Paragard   Other Topics Concern     Service No    Blood Transfusions No    Caffeine Concern No    Occupational Exposure No    Hobby Hazards No    Sleep Concern Yes     Comment: trouble staying asleep, taking sleep     Stress Concern Yes     Comment: new job, considering marriage, not feeling overwhelmed    Weight Concern No    Special Diet No    Back Care No    Exercise Yes     Comment: runner, cross-fit      Bike Helmet Yes    Seat Belt Yes    Self-Exams Yes   Social History Narrative    How much exercise per week? 3 days    How much calcium per day? 4-5 servings       How much caffeine per day? 1 cup coffee    How much vitamin D per day?  No supplements    Do you/your family wear seatbelts?  Yes    Do you/your family use safety helmets? Yes    Do you/your family use sunscreen? Yes    Do you/your family keep firearms in the home? No    Do you/your family have a smoke detector(s)? Yes        Do you feel safe in your home? Yes    Has anyone ever touched you in an unwanted manner? No     Explain         December 22, 2014 Kaleb Betancourt LPN    reveiewed kendrick moreno lpn 1-         Social Determinants of Health     Intimate Partner Violence: Not At Risk (8/24/2023)    Humiliation, Afraid, Rape, and Kick questionnaire     Fear of Current or Ex-Partner: No     Emotionally Abused: No     Physically Abused: No     Sexually Abused: No       ROS   ROS: 10 point ROS neg other than the symptoms noted above in the HPI.        10/20/2016    10:23 AM 2/16/2021    11:16 AM 8/24/2023    11:36 AM   PHQ-9 SCORE   PHQ-9 Total Score 1 2 2         2/16/2016     9:06 AM 2/12/2021     4:51 PM 8/24/2023    11:36 AM   YINA-7 SCORE   Total Score 8 (mild anxiety) 3 (minimal anxiety)    Total Score  3 0  "        EXAM:  Blood pressure 120/85, pulse 66, height 1.65 m (5' 4.96\"), weight 67.4 kg (148 lb 11.2 oz), last menstrual period 08/07/2023, unknown if currently breastfeeding. Body mass index is 24.77 kg/m .  General - pleasant female in no acute distress.  Skin - no suspicious lesions or rashes  EENT-  euthyroid with out palpable nodules  Neck - supple without lymphadenopathy.  Lungs - clear to auscultation bilaterally.  Heart - regular rate and rhythm without murmur.  Abdomen - soft, nontender, nondistended, no masses or organomegaly noted.  Musculoskeletal - no gross deformities.  Neurological - normal strength, sensation, and mental status.    Breast Exam:  Breast: Without visible skin changes. No dimpling or lesions seen.   Breasts supple, non-tender with palpation, no dominant mass, nodularity, or nipple discharge noted bilaterally. Axillary nodes negative.      Pelvic Exam: deferred      ASSESSMENT:  Encounter Diagnoses   Name Primary?    Visit for preventive health examination Yes    Encounter for screening for malignant neoplasm of colon     Screening for lipid disorders     Family history of malignant neoplasm of breast     Family history of colon cancer     Panic attack     Intrauterine device surveillance         PLAN:   Orders Placed This Encounter   Procedures    Lipid Profile    Comprehensive metabolic panel    Colonoscopy Screening  Referral    Adult Oncology/Hematology  Referral     Mammogram completed 8/28  Pap smear up to date  Lipid profile & CMP ordered   Vaccines up to date  Colonoscopy ordered - pt to schedule  Referral placed for genetic counseling given fam hx of cancers.   Continue on Zoloft 50mg daily at this time; currently feeling well. Counseled on avoidance of alcohol as coping mechanism - pt feels she is able to cut back at this time. Plan to check-in next spring to determine if a good time to decrease dose. Pt to reach out for mental health check-in sooner, PRN. " Paperwork completed to allow patient to return to work as mental health is well managed.   Mirena IUD due for removal/ replacement by 10/2024.    Additional teaching done at this visit regarding calcium (1200 mg per day), self breast exam, exercise, birth control, mental health, and weight/diet.    Return to clinic in one year.  Follow-up as needed.    Philomena Ochoa, DNP, APRN, WHNP

## 2023-08-24 ENCOUNTER — OFFICE VISIT (OUTPATIENT)
Dept: OBGYN | Facility: CLINIC | Age: 43
End: 2023-08-24
Attending: NURSE PRACTITIONER
Payer: COMMERCIAL

## 2023-08-24 VITALS
DIASTOLIC BLOOD PRESSURE: 85 MMHG | BODY MASS INDEX: 24.78 KG/M2 | SYSTOLIC BLOOD PRESSURE: 120 MMHG | HEART RATE: 66 BPM | WEIGHT: 148.7 LBS | HEIGHT: 65 IN

## 2023-08-24 DIAGNOSIS — F41.0 PANIC ATTACK: ICD-10-CM

## 2023-08-24 DIAGNOSIS — Z13.220 SCREENING FOR LIPID DISORDERS: ICD-10-CM

## 2023-08-24 DIAGNOSIS — Z30.431 INTRAUTERINE DEVICE SURVEILLANCE: ICD-10-CM

## 2023-08-24 DIAGNOSIS — Z80.0 FAMILY HISTORY OF COLON CANCER: ICD-10-CM

## 2023-08-24 DIAGNOSIS — Z80.3 FAMILY HISTORY OF MALIGNANT NEOPLASM OF BREAST: ICD-10-CM

## 2023-08-24 DIAGNOSIS — Z00.00 VISIT FOR PREVENTIVE HEALTH EXAMINATION: Primary | ICD-10-CM

## 2023-08-24 DIAGNOSIS — Z12.11 ENCOUNTER FOR SCREENING FOR MALIGNANT NEOPLASM OF COLON: ICD-10-CM

## 2023-08-24 PROCEDURE — 99396 PREV VISIT EST AGE 40-64: CPT | Performed by: NURSE PRACTITIONER

## 2023-08-24 PROCEDURE — G0463 HOSPITAL OUTPT CLINIC VISIT: HCPCS | Performed by: NURSE PRACTITIONER

## 2023-08-24 ASSESSMENT — ANXIETY QUESTIONNAIRES
IF YOU CHECKED OFF ANY PROBLEMS ON THIS QUESTIONNAIRE, HOW DIFFICULT HAVE THESE PROBLEMS MADE IT FOR YOU TO DO YOUR WORK, TAKE CARE OF THINGS AT HOME, OR GET ALONG WITH OTHER PEOPLE: NOT DIFFICULT AT ALL
2. NOT BEING ABLE TO STOP OR CONTROL WORRYING: NOT AT ALL
6. BECOMING EASILY ANNOYED OR IRRITABLE: NOT AT ALL
GAD7 TOTAL SCORE: 0
GAD7 TOTAL SCORE: 0
3. WORRYING TOO MUCH ABOUT DIFFERENT THINGS: NOT AT ALL
5. BEING SO RESTLESS THAT IT IS HARD TO SIT STILL: NOT AT ALL
1. FEELING NERVOUS, ANXIOUS, OR ON EDGE: NOT AT ALL
7. FEELING AFRAID AS IF SOMETHING AWFUL MIGHT HAPPEN: NOT AT ALL

## 2023-08-24 ASSESSMENT — PATIENT HEALTH QUESTIONNAIRE - PHQ9
SUM OF ALL RESPONSES TO PHQ QUESTIONS 1-9: 2
5. POOR APPETITE OR OVEREATING: NOT AT ALL

## 2023-08-24 ASSESSMENT — PAIN SCALES - GENERAL: PAINLEVEL: NO PAIN (0)

## 2023-08-24 NOTE — LETTER
"2023       RE: Nisreen Mohan  [address] [address]     Dear Colleague,    Thank you for referring your patient, Nisreen Mohan, to the [facility] [place] at [facility]. Please see a copy of my visit note below.      Progress Note    SUBJECTIVE:  Nisreen Mohan is an 43 year old, , who requests an Annual Preventive Exam.     Concerns today include:    Mental health: Had a panic attck in April while at work triggered by a difficult work situation and the way it was handled. She had lost her dad in February, who she was very close to.  Started therapy in 2023 through [facility]. Then lost her father-in law in May. Her husand lost his job recently as well.  Started Zoloft 50mg daily; has been on this dose since May 2023.  Has felt a marked difference - had been ruminating on things, couldn't sleep, felt like she was \"moving through melases\".  Lost her job due to having a panic attack at work and is current challenging this legally. Has since started a new job and is finding it to be a positive and supportive environment. 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 Requests paperwork to be completed that indicates she is able to return to work at this time.     2. Father had colorectal cancer - he  in January of sudden death. Patient has not had colon cancer screening yet.     3. Mother diagnosed with breast cancer in her early 50's: Last mammogram: 2022. She would like to have a mammogram today.      Genetic testing: Nisreen has a significant family history of cancer (mother- breast cancer; paternal aunt had stage 4 ovarian cancer;  father had prostate and colorectal cancer).  Her sister did genetic testing and it was negative.  She is interested in discussing BRCA 1&2 genetic testing.      Contraception: Mirena IUD placed placed 10/20/2016 (due for removal 10/2024). Reports satisfaction with this form of " contracpetion. Dos not desire to have any more children.   - Getting light periods.      Social: works as an ; currently works from home. Lives with  and 2 children.  Drinking alcohol several times per week, feels that she can control amount of drinking but that it has started to become somewhat habitual and part of her coping mechanisms.      Diet: eats balanced, home cooked meals.  is a . Gets three serving of calcium, mostly cheese.     Exercise: trying to get back into exercise routine  Alcohol: 1-2 daily    Lipid profile: DUE    Last pap smear: 2/2021 NIL, HPV negative    Menstrual History:      2/16/2021    10:00 AM 8/24/2023    10:30 AM 8/24/2023    10:37 AM   Menstrual History   LAST MENSTRUAL PERIOD 2/8/2021 8/7/2023    Period Cycle (Days)   15   Period Duration (Days)   5   Method of Contraception   Copper IUD   Period Pattern   Regular   Menstrual Flow   Light   Dysmenorrhea   None   Reviewed Today   Yes     Last Mammogram:   MA Screening Digital Bilateral    Result Date: 8/28/2023  Narrative: BILATERAL FULL FIELD DIGITAL SCREENING MAMMOGRAM Performed on: 8/28/23 Compared to: 04/07/2022 and 02/16/2021 Technique: This study was evaluated with the assistance of Computer-Aided Detection. Findings: The breasts are extremely dense, which lowers the sensitivity of mammography.  There is no radiographic evidence of malignancy.     MA Screening Digital Bilateral    Result Date: 4/7/2022  Narrative: BILATERAL FULL FIELD DIGITAL SCREENING MAMMOGRAM Performed on: 4/7/22 Compared to: 02/16/2021 Technique: This study was evaluated with the assistance of Computer-Aided Detection. Findings: The breasts are extremely dense, which lowers the sensitivity of mammography.  There is no radiographic evidence of malignancy. IMPRESSION: ACR BI-RADS Category 1: Negative RECOMMENDED FOLLOW-UP: Annual routine screening mammogram The results and recommendations of this examination will be communicated to  the patient.     MA Screening Digital Bilateral    Result Date: 2021  Narrative: EXAMINATION:  MA SCREENING DIGITAL BILATERAL, 2021 11:15 AM HISTORY/FAMILY HISTORY: No current or new breast symptoms, screening. Family history for breast cancer in mother, diagnosed in her mid 50's.  Family history of ovarian cancer in aunt, diagnosed in her mid 60's. COMPARISON: None, baseline TECHNIQUE: Standard mammographic views were performed FINDINGS: BREAST DENSITY: Extremely dense. There are no suspicious findings in either breast.     HISTORY:  levonorgestrel (MIRENA) 20 MCG/24HR IUD, 1 each by Intrauterine route once    No current facility-administered medications on file prior to visit.    No Known Allergies  Immunization History   Administered Date(s) Administered    COVID-19 Bivalent 12+ (Pfizer) 2022    COVID-19 MONOVALENT 12+ (Pfizer) 2021, 2021, 2021    Influenza (IIV3) PF 2012    Influenza Vaccine >6 months (Alfuria,Fluzone) 10/20/2016, 2021    TDAP Vaccine (Boostrix) 11/15/2012, 2016     OB History    Para Term  AB Living   3 2 2 0 1 2   SAB IAB Ectopic Multiple Live Births   0 0 0 0 2     Past Medical History:   Diagnosis Date    Anxiety     Depression     Menarche age    Cycles Q    Uncomplicated asthma      Past Surgical History:   Procedure Laterality Date    HC TOOTH EXTRACTION W/FORCEP      no troubles with sedation     Family History   Problem Relation Age of Onset    Breast Cancer Mother         lymphectomy on right side    Cancer Father 63        colon, rectal    Hypertension Father         takes medication    Prostate Cancer Father     Cardiovascular Maternal Grandmother         quad bypass    Cerebrovascular Disease Paternal Grandfather     Ovarian Cancer Paternal Aunt 68        stage 4    Other Cancer Other         Stage 4 Ovarian Cancer     Social History     Socioeconomic History    Marital status:      Spouse name: Grayson Hernandez  of children: 1    Years of education: None    Highest education level: None   Occupational History    Occupation:      Comment: full time   Tobacco Use    Smoking status: Never    Smokeless tobacco: Never   Vaping Use    Vaping Use: Never used   Substance and Sexual Activity    Alcohol use: Yes     Alcohol/week: 2.0 - 3.0 standard drinks of alcohol     Comment: 1 beer or wine per day 3-5 nights a week    Drug use: No    Sexual activity: Yes     Partners: Male     Birth control/protection: I.U.D., None     Comment: Paragard   Other Topics Concern     Service No    Blood Transfusions No    Caffeine Concern No    Occupational Exposure No    Hobby Hazards No    Sleep Concern Yes     Comment: trouble staying asleep, taking sleep     Stress Concern Yes     Comment: new job, considering marriage, not feeling overwhelmed    Weight Concern No    Special Diet No    Back Care No    Exercise Yes     Comment: runner, cross-fit      Bike Helmet Yes    Seat Belt Yes    Self-Exams Yes   Social History Narrative    How much exercise per week? 3 days    How much calcium per day? 4-5 servings       How much caffeine per day? 1 cup coffee    How much vitamin D per day?  No supplements    Do you/your family wear seatbelts?  Yes    Do you/your family use safety helmets? Yes    Do you/your family use sunscreen? Yes    Do you/your family keep firearms in the home? No    Do you/your family have a smoke detector(s)? Yes        Do you feel safe in your home? Yes    Has anyone ever touched you in an unwanted manner? No     Explain         December 22, 2014 Kaleb Betancourt LPN    reveiewealeksander cmcmki josh king 1-         Social Determinants of Health     Intimate Partner Violence: Not At Risk (8/24/2023)    Humiliation, Afraid, Rape, and Kick questionnaire     Fear of Current or Ex-Partner: No     Emotionally Abused: No     Physically Abused: No     Sexually Abused: No       ROS   ROS: 10 point ROS neg other than the  "symptoms noted above in the HPI.        10/20/2016    10:23 AM 2/16/2021    11:16 AM 8/24/2023    11:36 AM   PHQ-9 SCORE   PHQ-9 Total Score 1 2 2         2/16/2016     9:06 AM 2/12/2021     4:51 PM 8/24/2023    11:36 AM   YINA-7 SCORE   Total Score 8 (mild anxiety) 3 (minimal anxiety)    Total Score  3 0         EXAM:  Blood pressure 120/85, pulse 66, height 1.65 m (5' 4.96\"), weight 67.4 kg (148 lb 11.2 oz), last menstrual period 08/07/2023, unknown if currently breastfeeding. Body mass index is 24.77 kg/m .  General - pleasant female in no acute distress.  Skin - no suspicious lesions or rashes  EENT-  euthyroid with out palpable nodules  Neck - supple without lymphadenopathy.  Lungs - clear to auscultation bilaterally.  Heart - regular rate and rhythm without murmur.  Abdomen - soft, nontender, nondistended, no masses or organomegaly noted.  Musculoskeletal - no gross deformities.  Neurological - normal strength, sensation, and mental status.    Breast Exam:  Breast: Without visible skin changes. No dimpling or lesions seen.   Breasts supple, non-tender with palpation, no dominant mass, nodularity, or nipple discharge noted bilaterally. Axillary nodes negative.      Pelvic Exam: deferred      ASSESSMENT:  Encounter Diagnoses   Name Primary?    Visit for preventive health examination Yes    Encounter for screening for malignant neoplasm of colon     Screening for lipid disorders     Family history of malignant neoplasm of breast     Family history of colon cancer     Panic attack     Intrauterine device surveillance         PLAN:   Orders Placed This Encounter   Procedures    Lipid Profile    Comprehensive metabolic panel    Colonoscopy Screening  Referral    Adult Oncology/Hematology  Referral     Mammogram completed 8/28  Pap smear up to date  Lipid profile & CMP ordered   Vaccines up to date  Colonoscopy ordered - pt to schedule  Referral placed for genetic counseling given fam hx of cancers. "   Continue on Zoloft 50mg daily at this time; currently feeling well. Counseled on avoidance of alcohol as coping mechanism - pt feels she is able to cut back at this time. Plan to check-in next spring to determine if a good time to decrease dose. Pt to reach out for mental health check-in sooner, PRN. Paperwork completed to allow patient to return to work as mental health is well managed.   Mirena IUD due for removal/ replacement by 10/2024.    Additional teaching done at this visit regarding calcium (1200 mg per day), self breast exam, exercise, birth control, mental health, and weight/diet.    Return to clinic in one year.  Follow-up as needed.    Philomena Ochoa, DNP, APRN, WHNP

## 2023-08-28 ENCOUNTER — ANCILLARY PROCEDURE (OUTPATIENT)
Dept: MAMMOGRAPHY | Facility: CLINIC | Age: 43
End: 2023-08-28
Attending: ADVANCED PRACTICE MIDWIFE
Payer: COMMERCIAL

## 2023-08-28 DIAGNOSIS — Z12.31 VISIT FOR SCREENING MAMMOGRAM: ICD-10-CM

## 2023-08-28 PROCEDURE — 77067 SCR MAMMO BI INCL CAD: CPT | Mod: 26 | Performed by: RADIOLOGY

## 2023-08-28 PROCEDURE — 77067 SCR MAMMO BI INCL CAD: CPT

## 2023-08-31 PROBLEM — Z80.0 FAMILY HISTORY OF COLON CANCER: Status: ACTIVE | Noted: 2023-08-31

## 2023-08-31 PROBLEM — Z80.3 FAMILY HISTORY OF MALIGNANT NEOPLASM OF BREAST: Status: ACTIVE | Noted: 2023-08-31

## 2023-09-01 ENCOUNTER — PATIENT OUTREACH (OUTPATIENT)
Dept: ONCOLOGY | Facility: CLINIC | Age: 43
End: 2023-09-01
Payer: COMMERCIAL

## 2023-09-01 NOTE — PROGRESS NOTES
Writer received referral to Cancer Risk Management/Genetic Counseling.    Referred for: family history of several cancers including breast, colon, prostate, and ovarian      Referral reviewed for appropriate plan, and sent to New Patient Scheduling for completion.    Yasmeen Payne RN, BSN  Oncology New Patient Nurse Navigator   [facility] [place]  [phone]

## 2023-09-18 ENCOUNTER — TELEPHONE (OUTPATIENT)
Dept: GASTROENTEROLOGY | Facility: CLINIC | Age: 43
End: 2023-09-18
Payer: COMMERCIAL

## 2023-09-18 NOTE — TELEPHONE ENCOUNTER
"Endoscopy Scheduling Screen    Have you had a positive Covid test in the last 14 days?  No    Are you active on MyChart?   Yes    What insurance is in the chart?  Other:      Ordering/Referring Provider:   GALEN DEVI        (If ordering provider performs procedure, schedule with ordering provider unless otherwise instructed. )    BMI: Estimated body mass index is 24.77 kg/m  as calculated from the following:    Height as of 8/24/23: 1.65 m (5' 4.96\").    Weight as of 8/24/23: 67.4 kg (148 lb 11.2 oz).     Sedation Ordered  moderate sedation.   If patient BMI > 50 do not schedule in [facility].    If patient BMI > 45 do not schedule at [facility].    Are you taking methadone or Suboxone?  No    Are you taking any prescription medications for pain 3 or more times per week?   No    Do you have a history of malignant hyperthermia or adverse reaction to anesthesia?  No    (Females) Are you currently pregnant?   No     Have you been diagnosed or told you have pulmonary hypertension?   No    Do you have an LVAD?  No    Have you been told you have moderate to severe sleep apnea?  No    Have you been told you have COPD, asthma, or any other lung disease?  No    Do you have any heart conditions?  No     Have you ever had an organ transplant?   No    Have you ever had or are you awaiting a heart or lung transplant?   No    Have you had a stroke or transient ischemic attack (TIA aka \"mini stroke\" in the last 6 months?   No    Have you been diagnosed with or been told you have cirrhosis of the liver?   No    Are you currently on dialysis?   No    Do you need assistance transferring?   No    BMI: Estimated body mass index is 24.77 kg/m  as calculated from the following:    Height as of 8/24/23: 1.65 m (5' 4.96\").    Weight as of 8/24/23: 67.4 kg (148 lb 11.2 oz).     Is patients BMI > 40 and scheduling location [facility]?  No    Do you take an injectable medication for weight loss or diabetes (excluding insulin)?  No    Do you take " the medication Naltrexone?  No    Do you take blood thinners?  No       Prep   Are you currently on dialysis or do you have chronic kidney disease?  No    Do you have a diagnosis of diabetes?  No    Do you have a diagnosis of cystic fibrosis (CF)?  No    On a regular basis do you go 3 -5 days between bowel movements?  No    BMI > 40?  No    Preferred Pharmacy:      [id number] [place] [address] & [address] [address]  Phone: [phone] Fax: [phone]      Final Scheduling Details   Colonoscopy prep sent?  Standard MiraLAX    Procedure scheduled  Colonoscopy    Surgeon:  DAVID     Date of procedure:  12/08/2023     Pre-OP / PAC:   No - Not required for this site.    Location  [facility] - Patient preference.    Sedation   Moderate Sedation - Per order.      Patient Reminders:   You will receive a call from a Nurse to review instructions and health history.  This assessment must be completed prior to your procedure.  Failure to complete the Nurse assessment may result in the procedure being cancelled.      On the day of your procedure, please designate an adult(s) who can drive you home stay with you for the next 24 hours. The medicines used in the exam will make you sleepy. You will not be able to drive.      You cannot take public transportation, ride share services, or non-medical taxi service without a responsible caregiver.  Medical transport services are allowed with the requirement that a responsible caregiver will receive you at your destination.  We require that drivers and caregivers are confirmed prior to your procedure.

## 2023-11-10 ENCOUNTER — TELEPHONE (OUTPATIENT)
Dept: GASTROENTEROLOGY | Facility: CLINIC | Age: 43
End: 2023-11-10
Payer: COMMERCIAL

## 2023-11-10 NOTE — TELEPHONE ENCOUNTER
Caller:   Reason for Reschedule/Cancellation (please be detailed, any staff messages or encounters to note?): CONFLICT      Prior to reschedule please review:  Ordering Provider: GALEN DEVI   Sedation per order: CS  Does patient have any ASC Exclusions, please identify?:       Notes on Cancelled Procedure:  Procedure: Lower Endoscopy [Colonoscopy]   Date: 12/8  Location: [facility]; [address] [place] [address]  Surgeon: DAVID      Rescheduled: Yes  Procedure: Lower Endoscopy [Colonoscopy]   Date: 2/2  Location: [facility]; [address] [place] [address]  Surgeon: DAVID  Sedation Level Scheduled  CS,  Reason for Sedation Level ORDER  Prep/Instructions updated and sent: Y       Send In - basket message to Panc - Daniel Pool if [facility]  procedure is canceled or rescheduled: [ N/A, YES or NO]              
64F with PMHx of uncontrolled DM (non-compliant), viral myopathy, Paroxysmal Afib, recent labial abscess s/p ID, hip fracture s/p fixation, recent LGIB bleed now presents for shortness of breath.  Cardiac enzymes ruled in for NSTEMI.  Cardiac catheterization demonstrated triple vessel LM disease.

## 2024-01-22 ENCOUNTER — TELEPHONE (OUTPATIENT)
Dept: GASTROENTEROLOGY | Facility: CLINIC | Age: 44
End: 2024-01-22
Payer: COMMERCIAL

## 2024-01-22 NOTE — TELEPHONE ENCOUNTER
Pre assessment completed for upcoming procedure.   (Please see previous telephone encounter notes for complete details)      Procedure details:    Arrival time and facility location reviewed.    Pre op exam needed? N/A    Designated  policy reviewed. Instructed to have someone stay 6 hours post procedure.     COVID policy reviewed.      Medication review:    Medications reviewed. Please see supporting documentation below. Holding recommendations discussed (if applicable).       Prep for procedure:     Procedure prep instructions reviewed.        Any additional information needed:  N/A      Patient  verbalized understanding and had no questions or concerns at this time.      Sherly Curiel RN  Endoscopy Procedure Pre Assessment RN  [phone] option 4

## 2024-01-22 NOTE — TELEPHONE ENCOUNTER
Pre visit planning completed.      Procedure details:    Patient scheduled for Colonoscopy  on 2/2/24.     Arrival time: 0630. Procedure time 0730    Pre op exam needed? N/A    Facility location: [facility]; [address] [place] [address], [address] Floor, [place] [id number]    Sedation type: Conscious sedation     Indication for procedure: screening       Chart review:     Electronic implanted devices? No    Recent diagnosis of diverticulitis within the last 6 weeks? No    Diabetic? No      Medication review:    Anticoagulants? No    NSAIDS? No    Other medication HOLDING recommendations:  N/A      Prep for procedure:     Bowel prep recommendation: Standard Miralax   Due to:  standard bowel prep.    Prep instructions sent via [employer]         Sherly Curiel RN  Endoscopy Procedure Pre Assessment RN  [phone] option 4

## 2024-02-01 RX ORDER — ONDANSETRON 2 MG/ML
4 INJECTION INTRAMUSCULAR; INTRAVENOUS
Status: CANCELLED | OUTPATIENT
Start: 2024-02-01

## 2024-02-01 RX ORDER — NALOXONE HYDROCHLORIDE 0.4 MG/ML
0.2 INJECTION, SOLUTION INTRAMUSCULAR; INTRAVENOUS; SUBCUTANEOUS
Status: CANCELLED | OUTPATIENT
Start: 2024-02-01

## 2024-02-01 RX ORDER — ONDANSETRON 4 MG/1
4 TABLET, ORALLY DISINTEGRATING ORAL EVERY 6 HOURS PRN
Status: CANCELLED | OUTPATIENT
Start: 2024-02-01

## 2024-02-01 RX ORDER — ONDANSETRON 2 MG/ML
4 INJECTION INTRAMUSCULAR; INTRAVENOUS EVERY 6 HOURS PRN
Status: CANCELLED | OUTPATIENT
Start: 2024-02-01

## 2024-02-01 RX ORDER — PROCHLORPERAZINE MALEATE 10 MG
10 TABLET ORAL EVERY 6 HOURS PRN
Status: CANCELLED | OUTPATIENT
Start: 2024-02-01

## 2024-02-01 RX ORDER — NALOXONE HYDROCHLORIDE 0.4 MG/ML
0.4 INJECTION, SOLUTION INTRAMUSCULAR; INTRAVENOUS; SUBCUTANEOUS
Status: CANCELLED | OUTPATIENT
Start: 2024-02-01

## 2024-02-01 RX ORDER — LIDOCAINE 40 MG/G
CREAM TOPICAL
Status: CANCELLED | OUTPATIENT
Start: 2024-02-01

## 2024-02-01 RX ORDER — FLUMAZENIL 0.1 MG/ML
0.2 INJECTION, SOLUTION INTRAVENOUS
Status: CANCELLED | OUTPATIENT
Start: 2024-02-01 | End: 2024-02-01

## 2024-02-02 ENCOUNTER — HOSPITAL ENCOUNTER (OUTPATIENT)
Facility: CLINIC | Age: 44
Discharge: HOME | End: 2024-02-02
Attending: INTERNAL MEDICINE | Admitting: INTERNAL MEDICINE
Payer: COMMERCIAL

## 2024-02-02 VITALS
OXYGEN SATURATION: 97 % | HEART RATE: 83 BPM | SYSTOLIC BLOOD PRESSURE: 97 MMHG | RESPIRATION RATE: 16 BRPM | DIASTOLIC BLOOD PRESSURE: 68 MMHG

## 2024-02-02 LAB — COLONOSCOPY: NORMAL

## 2024-02-02 PROCEDURE — G0500 MOD SEDAT ENDO SERVICE >5YRS: HCPCS | Performed by: INTERNAL MEDICINE

## 2024-02-02 PROCEDURE — 250N000011 HC RX IP 250 OP 636: Performed by: INTERNAL MEDICINE

## 2024-02-02 PROCEDURE — 88305 TISSUE EXAM BY PATHOLOGIST: CPT | Mod: 26 | Performed by: PATHOLOGY

## 2024-02-02 PROCEDURE — 45380 COLONOSCOPY AND BIOPSY: CPT | Mod: PT | Performed by: INTERNAL MEDICINE

## 2024-02-02 PROCEDURE — 88305 TISSUE EXAM BY PATHOLOGIST: CPT | Mod: TC | Performed by: INTERNAL MEDICINE

## 2024-02-02 RX ORDER — FENTANYL CITRATE 50 UG/ML
INJECTION, SOLUTION INTRAMUSCULAR; INTRAVENOUS PRN
Status: DISCONTINUED | OUTPATIENT
Start: 2024-02-02 | End: 2024-02-02 | Stop reason: HOSPADM

## 2024-02-02 ASSESSMENT — ACTIVITIES OF DAILY LIVING (ADL): ADLS_ACUITY_SCORE: 35

## 2024-02-02 NOTE — H&P
ENDOSCOPY PRE-SEDATION H&P FOR OUTPATIENT PROCEDURES    Nisreen Mohan  [id number]  1980    Procedure: colonoscopy    Pre-procedure diagnosis: fam hx CRC    Past medical history:   Past Medical History:   Diagnosis Date    Anxiety     Depression     Menarche age    Cycles Q    Uncomplicated asthma        Past surgical history:   Past Surgical History:   Procedure Laterality Date    HC TOOTH EXTRACTION W/FORCEP      no troubles with sedation       No current facility-administered medications for this encounter.       No Known Allergies    History of Anesthesia/Sedation Problems: no    PHYSICAL EXAMINATION:  Constitutional: aaox3, cooperative, pleasant  Vitals reviewed: LMP 01/19/2024   Wt:   Wt Readings from Last 2 Encounters:   08/24/23 67.4 kg (148 lb 11.2 oz)   02/16/21 65.9 kg (145 lb 3.2 oz)      Eyes: Sclera anicteric/injected  Ears/nose/mouth/throat: Normal oropharynx without ulcers or exudate, mucus membranes moist, hearing intact  Neck: supple, thyroid normal size  CV: No edema  Respiratory: Unlabored breathing  Lymph: No submandibular, supraclavicular or inguinal lymphadenopathy  Abd: Nondistended, no masses, nontender  Skin: warm, perfused, no jaundice  Psych: Normal affect  MSK: normal movement on limited exam.    ASA Score: See Provation note    Assessment/Plan:     The patient is an appropriate candidate to receive sedation.    Informed consent was discussed with the patient/family, including the risks, benefits, potential complications and any alternative options associated with sedation.    Patient assessment completed just prior to sedation and while under constant observation by the provider. Condition determined to be adequate for proceeding with sedation.    The specific risks for the procedure were discussed with the patient at the time of informed consent and include but are not limited to perforation which could require surgery, missing significant neoplasm or lesion, hemorrhage and  adverse sedative complication.      Nav Harp MD

## 2024-02-05 LAB
PATH REPORT.COMMENTS IMP SPEC: NORMAL
PATH REPORT.COMMENTS IMP SPEC: NORMAL
PATH REPORT.FINAL DX SPEC: NORMAL
PATH REPORT.GROSS SPEC: NORMAL
PATH REPORT.MICROSCOPIC SPEC OTHER STN: NORMAL
PATH REPORT.RELEVANT HX SPEC: NORMAL
PHOTO IMAGE: NORMAL

## 2024-03-26 ENCOUNTER — VIRTUAL VISIT (OUTPATIENT)
Dept: ONCOLOGY | Facility: CLINIC | Age: 44
End: 2024-03-26
Attending: NURSE PRACTITIONER
Payer: COMMERCIAL

## 2024-03-26 DIAGNOSIS — Z80.0 FAMILY HISTORY OF COLON CANCER: ICD-10-CM

## 2024-03-26 DIAGNOSIS — Z80.3 FAMILY HISTORY OF MALIGNANT NEOPLASM OF BREAST: ICD-10-CM

## 2024-03-26 DIAGNOSIS — Z80.41 FAMILY HISTORY OF MALIGNANT NEOPLASM OF OVARY: Primary | ICD-10-CM

## 2024-03-26 PROCEDURE — 96040 HC GENETIC COUNSELING, EACH 30 MINUTES: CPT | Mod: GT,95 | Performed by: GENETIC COUNSELOR, MS

## 2024-03-26 NOTE — LETTER
3/26/2024         RE: Nisreen Mohan  [address] [address]        Dear Colleague,    Thank you for referring your patient, Nisreen Mohan, to the [facility]. Please see a copy of my visit note below.    3/26/2024    Virtual Visit Details  Type of service:  Video Visit   Originating Location (pt. Location): Home  Distant Location (provider location):  Off-site  Platform used for Video Visit: SeeControl  Length of video visit: 43 minutes    Referring Provider: CARSON Wynne CNP    Presenting Information:   Today Nisreen elected for a virtual genetic counseling visit through the Cancer Risk Management Program to discuss her family history of cancer. We reviewed this history, cancer screening recommendations, and available genetic testing options.    Personal History:  Nisreen is a 44 year old female. She does not have any personal history of cancer.    She had her first menstrual period at age 13, her first child at age 33, and is premenopausal. Nisreen has her ovaries, fallopian tubes and uterus in place, and she has had no ovarian cancer screening to date. She reports that she has never used hormone replacement therapy.      She has annual mammograms and her most recent mammogram in August 2023 was normal (extremely dense breast tissue). Her first colonoscopy in February 2024 identified one 7mm sessile serrated adenoma and follow-up was recommended in five years. She does not regularly do any other cancer screening at this time.    Family History: (Please see scanned pedigree for detailed family history information)  Nisreen's mother is 75 and was diagnosed with breast cancer at age 55; treatment included a lumpectomy, chemotherapy, and radiation. She has not pursued genetic testing.  Nisreen's father was diagnosed with colon cancer and possibly a separate primary rectal cancer around age 65-68, then passed away at age 79. No genetic testing was pursued.  Nisreen's  paternal aunt is 75 and was diagnosed with ovarian cancer at age 70; treatment has included surgery, chemotherapy, and radiation. She has not pursued genetic testing and is likely to decline it.  Her maternal ethnicity is [place] citizen, Hungarian, and English. Her paternal ethnicity is Citizen of [place] and [place] citizen. There is no known Ashkenazi Mandaeism ancestry on either side of her family.    Discussion:  We reviewed the features of sporadic, familial, and hereditary cancers. In looking at Nisreen's paternal family history, it is possible that a cancer susceptibility gene is present as her father and paternal aunt have been diagnosed with related cancers; her father may have also had two primary cancers. We also reviewed that ovarian cancer has an increased chance to have a hereditary explanation (20-25% chance). Nisreen's maternal family history is currently more consistent with sporadic cancer, given her mother's isolated diagnosis of breast cancer over age 50.  We discussed the natural history and genetics of hereditary cancer, including Michael syndrome.   Michael syndrome can be caused by a mutation in one of five genes: MLH1, MSH2, MSH6, PMS2, and EPCAM. The highest cancer risks associated with Michael syndrome include colon, endometrial/uterine, gastric, and ovarian cancer. Other cancers have also been reported with Michael syndrome, including urinary tract and prostate cancers.   We discussed that there are additional genes that could cause increased risk for the cancers in her family. For example, the most common causes of hereditary ovarian cancer are mutations in the BRCA1 and BRCA2 genes. As many of these genes present with overlapping features in a family and accurate cancer risk cannot always be established based upon the pedigree analysis alone, it would be reasonable for Nisreen to consider panel genetic testing to analyze multiple genes at once.  Based on her family history, Nisreen meets current National Comprehensive Cancer  Network (NCCN) criteria for genetic testing of high penetrance ovarian cancer genes (i.e. KANDIS, BRCA1, BRCA2, BRIP1, Michael syndrome genes, PALB2, RAD51C, RAD51D, etc.).  A detailed handout regarding these genes/syndromes and the information we discussed was provided to Nisreen at the end of our appointment today and can be found in the after visit summary. Topics included: inheritance pattern, cancer risks, cancer screening recommendations, and also risks, benefits and limitations of testing.  We discussed that genetic testing for cancer susceptibility genes is typically most informative, though, when it is first performed on a family member with a personal history of cancer. Testing is available to Nisreen, but with limitations. If Nisreen pursues testing at this time and receives a negative result, this does not rule out the possibility of a hereditary cancer syndrome in her and/or her family. Despite these limitations and given that her paternal relatives have not pursued genetic testing (her father passed away, her aunt would decline testing), Nisreen expressed interest in proceeding with testing herself.  We reviewed genetic testing options for hereditary colon, gynecologic, and related cancers: [facility] Common Hereditary Cancers Panel and expanded [facility] Multi-Cancer Panel. She opted for the [facility] Common Hereditary Cancers Panel.  Genetic testing is available for 48 genes associated with cancers of the breast, ovary, uterus, prostate, and gastrointestinal system: [facility] Common Hereditary Cancers Panel (APC, KANDIS, AXIN2, BAP1, BARD1, BMPR1A, BRCA1, BRCA2, BRIP1, CDH1, CDK4, CDKN2A, CHEK2, CTNNA1, DICER1, EPCAM, FH, GREM1, HOXB13, KIT, MBD4, MEN1, MLH1, MSH2, MSH3, MSH6, MUTYH, NF1, NTHL1, PALB2, PDGFRA, PMS2, POLD1, POLE, PTEN, RAD51C, RAD51D, SDHA, SDHB, SDHC, SDHD, SMAD4, SMARCA4, STK11, TP53, TSC1, TSC2, VHL).  Consent was obtained over the video and Nisreen elected to schedule a lab appointment at her earliest  convenience. Once her blood is drawn, it will be sent to [facility] for testing. Of note, testing will begin with the BRCA1 and BRCA2 genes, with reflex to the complete panel. [facility]  Medical Management: For Nisreen, we reviewed that the information from genetic testing may determine:  additional cancer screening for which Nisreen may qualify (i.e. mammogram and breast MRI, more frequent colonoscopies, more frequent dermatologic exams, etc.),  options for risk reducing surgeries Nisreen could consider (i.e. bilateral mastectomy, surgery to remove her ovaries and/or uterus, etc.),    and targeted chemotherapies if she were to develop certain cancers in the future (i.e. immunotherapy for individuals with Michael syndrome, PARP inhibitors, etc.).   These recommendations and possible targeted chemotherapies will be discussed in detail once genetic testing is completed.     Plan:  1) Today Nisreen elected to proceed with testing of the BRCA1 and BRCA2 genes, with reflex to the [facility] Common Hereditary Cancers Panel, through [facility]. She will schedule a lab appointment at her earliest convenience.  2) The results should be available 2-3 weeks after [facility] receives her blood sample.  3) Nisreen will be contacted to schedule a virtual return visit with me to discuss the results.    Christal Zepeda MS, [facility]  Licensed, Certified Genetic Counselor  Office: [phone]  [email or web address]

## 2024-03-26 NOTE — PROGRESS NOTES
3/26/2024    Virtual Visit Details  Type of service:  Video Visit   Originating Location (pt. Location): Home  Distant Location (provider location):  Off-site  Platform used for Video Visit: Diamond  Length of video visit: 43 minutes    Referring Provider: CARSON Wynne CNP    Presenting Information:   Today Nisreen elected for a virtual genetic counseling visit through the Cancer Risk Management Program to discuss her family history of cancer. We reviewed this history, cancer screening recommendations, and available genetic testing options.    Personal History:  Nisreen is a 44 year old female. She does not have any personal history of cancer.    She had her first menstrual period at age 13, her first child at age 33, and is premenopausal. Nisreen has her ovaries, fallopian tubes and uterus in place, and she has had no ovarian cancer screening to date. She reports that she has never used hormone replacement therapy.      She has annual mammograms and her most recent mammogram in August 2023 was normal (extremely dense breast tissue). Her first colonoscopy in February 2024 identified one 7mm sessile serrated adenoma and follow-up was recommended in five years. She does not regularly do any other cancer screening at this time.    Family History: (Please see scanned pedigree for detailed family history information)  Nisreen's mother is 75 and was diagnosed with breast cancer at age 55; treatment included a lumpectomy, chemotherapy, and radiation. She has not pursued genetic testing.  Nisreen's father was diagnosed with colon cancer and possibly a separate primary rectal cancer around age 65-68, then passed away at age 79. No genetic testing was pursued.  Nisreen's paternal aunt is 75 and was diagnosed with ovarian cancer at age 70; treatment has included surgery, chemotherapy, and radiation. She has not pursued genetic testing and is likely to decline it.  Her maternal ethnicity is Ukrainian, Maltese, and English. Her  paternal ethnicity is [place]ian and [place]. There is no known Ashkenazi Anabaptism ancestry on either side of her family.    Discussion:  We reviewed the features of sporadic, familial, and hereditary cancers. In looking at Nisreen's paternal family history, it is possible that a cancer susceptibility gene is present as her father and paternal aunt have been diagnosed with related cancers; her father may have also had two primary cancers. We also reviewed that ovarian cancer has an increased chance to have a hereditary explanation (20-25% chance). Nisreen's maternal family history is currently more consistent with sporadic cancer, given her mother's isolated diagnosis of breast cancer over age 50.  We discussed the natural history and genetics of hereditary cancer, including Michael syndrome.   Michael syndrome can be caused by a mutation in one of five genes: MLH1, MSH2, MSH6, PMS2, and EPCAM. The highest cancer risks associated with Michael syndrome include colon, endometrial/uterine, gastric, and ovarian cancer. Other cancers have also been reported with Mcihael syndrome, including urinary tract and prostate cancers.   We discussed that there are additional genes that could cause increased risk for the cancers in her family. For example, the most common causes of hereditary ovarian cancer are mutations in the BRCA1 and BRCA2 genes. As many of these genes present with overlapping features in a family and accurate cancer risk cannot always be established based upon the pedigree analysis alone, it would be reasonable for Nisreen to consider panel genetic testing to analyze multiple genes at once.  Based on her family history, Nisreen meets current National Comprehensive Cancer Network (NCCN) criteria for genetic testing of high penetrance ovarian cancer genes (i.e. KANDIS, BRCA1, BRCA2, BRIP1, Michael syndrome genes, PALB2, RAD51C, RAD51D, etc.).  A detailed handout regarding these genes/syndromes and the information we discussed was  provided to Nisreen at the end of our appointment today and can be found in the after visit summary. Topics included: inheritance pattern, cancer risks, cancer screening recommendations, and also risks, benefits and limitations of testing.  We discussed that genetic testing for cancer susceptibility genes is typically most informative, though, when it is first performed on a family member with a personal history of cancer. Testing is available to Nisreen, but with limitations. If Nisreen pursues testing at this time and receives a negative result, this does not rule out the possibility of a hereditary cancer syndrome in her and/or her family. Despite these limitations and given that her paternal relatives have not pursued genetic testing (her father passed away, her aunt would decline testing), Nisreen expressed interest in proceeding with testing herself.  We reviewed genetic testing options for hereditary colon, gynecologic, and related cancers: [facility] Common Hereditary Cancers Panel and expanded [facility] Multi-Cancer Panel. She opted for the [facility] Common Hereditary Cancers Panel.  Genetic testing is available for 48 genes associated with cancers of the breast, ovary, uterus, prostate, and gastrointestinal system: [facility] Common Hereditary Cancers Panel (APC, KANDIS, AXIN2, BAP1, BARD1, BMPR1A, BRCA1, BRCA2, BRIP1, CDH1, CDK4, CDKN2A, CHEK2, CTNNA1, DICER1, EPCAM, FH, GREM1, HOXB13, KIT, MBD4, MEN1, MLH1, MSH2, MSH3, MSH6, MUTYH, NF1, NTHL1, PALB2, PDGFRA, PMS2, POLD1, POLE, PTEN, RAD51C, RAD51D, SDHA, SDHB, SDHC, SDHD, SMAD4, SMARCA4, STK11, TP53, TSC1, TSC2, VHL).  Consent was obtained over the video and Nisreen elected to schedule a lab appointment at her earliest convenience. Once her blood is drawn, it will be sent to [facility] for testing. Of note, testing will begin with the BRCA1 and BRCA2 genes, with reflex to the complete panel. Turn around time: 2-3 weeks after [facility] receives her blood sample.  Medical  Management: For Nisreen, we reviewed that the information from genetic testing may determine:  additional cancer screening for which Nisreen may qualify (i.e. mammogram and breast MRI, more frequent colonoscopies, more frequent dermatologic exams, etc.),  options for risk reducing surgeries Nisreen could consider (i.e. bilateral mastectomy, surgery to remove her ovaries and/or uterus, etc.),    and targeted chemotherapies if she were to develop certain cancers in the future (i.e. immunotherapy for individuals with Michael syndrome, PARP inhibitors, etc.).   These recommendations and possible targeted chemotherapies will be discussed in detail once genetic testing is completed.     Plan:  1) Today Nisreen elected to proceed with testing of the BRCA1 and BRCA2 genes, with reflex to the [facility] Common Hereditary Cancers Panel, through [facility]. She will schedule a lab appointment at her earliest convenience.  2) The results [facility]  3) Nisreen will be contacted to schedule a virtual return visit with me to discuss the results.    Christal Zepeda MS, [facility]  Licensed, Certified Genetic Counselor  Office: [phone]  [email or web address]   no

## 2024-03-26 NOTE — NURSING NOTE
Is the patient currently in the state of [place]? YES    Visit mode:VIDEO    If the visit is dropped, the patient can be reconnected by: VIDEO VISIT: Send to e-mail at: [email or web address]    Will anyone else be joining the visit? NO  (If patient encounters technical issues they should call [phone]    How would you like to obtain your AVS? MyChart    Are changes needed to the allergy or medication list? N/A    Reason for visit: Consult      Jenni FRITZ

## 2024-03-27 NOTE — PATIENT INSTRUCTIONS
Assessing Cancer Risk  Cancer is a common diagnosis which impacts many families.  Individuals may develop cancer due to environmental factors (such as exposures and lifestyle), aging, genetic predisposition, or a combination of these factors.      Only about 5-10% of cancers are thought to be due to an inherited cancer susceptibility gene.    These families often have:  Several people with the same or related types of cancer  Cancers diagnosed at a young age (before age 50)  Individuals with more than one primary cancer  Multiple generations of the family affected with cancer    Comprehensive Breast and Gynecologic Cancer Panel  We each inherit two copies of every gene in our bodies: one from our mother, and one from our father. Each gene has a specific job to do.  When a gene has a mistake or  mutation  in it, it does not work like it should.     Some people may be candidates for genetic testing of more than one gene.  For these families, genetic testing using a cancer panel may be offered. These panels will test different genes at once known to increase the risk for breast, ovarian, uterine, and/or other cancers.    This handout will review common hereditary breast and gynecologic cancer syndromes. The genes that will be discussed in this handout are: KANDIS, BRCA1, BRCA2, BRIP1, CDH1, CHEK2, MLH1, MSH2, MSH6, PMS2, EPCAM, PTEN, PALB2, RAD51C, RAD51D, and TP53.    The purpose of this handout is to serve as a brief summary of the breast and gynecologic cancer risk genes that have published clinical management guidelines for individuals who are found to carry a mutation. Inheriting a mutation does not mean a person will develop cancer, but it does significantly increase their risk above the general population risk.     ______________________________________________________________________________    Hereditary Breast and Ovarian Cancer Syndrome (BRCA1 and BRCA2)  A single mutation in one of the copies of BRCA1 or  BRCA2 increases the risk for breast and ovarian cancer, among others.  The risk for pancreatic cancer and melanoma may also be slightly increased in some families.  The chart below shows the chance that someone with a BRCA mutation would develop cancer in his or her lifetime1,2,3,4.       Lifetime Cancer Risks    General Population BRCA1  BRCA2   Breast  12% >60% >60%   Ovarian  1-2% 39-58% 13-29%   Prostate 12% 7-26% 19-61%   Male Breast 0.1% 0.2-1.2% 1.8-7.1%   Pancreas 1-2% Up to 5% 5-10%     A person s ethnic background is also important to consider, as individuals of Ashkenazi Rastafarian ancestry have a higher chance of having a BRCA gene mutation.  There are three BRCA mutations that occur more frequently in this population.      Michael Syndrome (MLH1, MSH2, MSH6, PMS2, and EPCAM)  Currently five genes are known to cause Michael Syndrome: MLH1, MSH2, MSH6, PMS2, and EPCAM.  A single mutation in one of the Michael Syndrome genes increases the risk for colon, endometrial, ovarian, and stomach cancers.  Other cancers that occur less commonly in Michael Syndrome include urinary tract, skin, and brain cancers.  The chart below shows the chance that a person with Michael syndrome would develop cancer in his or her lifetime5.      Lifetime Cancer Risks    General Population Michael Syndrome   Colon 5% 10-61%   Endometrial 3% 13-57%   Ovarian 1-2% 1-38%   Stomach <1% 1-9%   *Cancer risk varies depending on Michael syndrome gene found      Cowden Syndrome (PTEN)  Cowden syndrome is a hereditary condition that increases the risk for breast, thyroid, endometrial, colon, and kidney cancer.  Cowden syndrome is caused by a mutation in the PTEN gene.  A single mutation in one of the copies of PTEN causes Cowden syndrome and increases cancer risk.  The chart below shows the chance that someone with a PTEN mutation would develop cancer in their lifetime6,7.  Other benign features seen in some individuals with Cowden syndrome include benign  skin lesions (facial papules, keratoses, lipomas), learning disability, autism, thyroid nodules, colon polyps, and larger head size.     Lifetime Cancer Risks    General Population Cowden   Breast 12% 40-60%*   Thyroid 1% Up to 38%   Renal 1-2% Up to 35%   Endometrial 3% Up to 28%   Colon 5% Up to 9%   Melanoma 2-3% Up to 6%   *Emerging data suggests the risk for breast cancer could be greater than 60%               Li-Fraumeni Syndrome (TP53)  Li-Fraumeni Syndrome (LFS) is a cancer predisposition syndrome caused by a mutation in the TP53 gene. A single mutation in one of the copies of TP53 increases the risk for multiple cancers. Individuals with LFS are at an increased risk for developing cancer at a young age. The lifetime risk for development of a LFS-associated cancer is 50% by age 30 and 90% by age 60.   Core Cancers: Sarcomas, Breast, Brain, Lung, Leukemias/Lymphomas, Adrenocortical carcinomas  Other Cancers: Gastrointestinal, Thyroid, Skin, Genitourinary       Hereditary Diffuse Gastric Cancer (CDH1)  Currently, one gene is known to cause hereditary diffuse gastric cancer (HDGC): CDH1.  Individuals with HDGC are at increased risk for diffuse gastric cancer and lobular breast cancer. Of people diagnosed with HDGC, 30-50% have a mutation in the CDH1 gene.  This suggests there are likely other genes that may cause HDGC that have not been identified yet.      Lifetime Cancer Risks    General Population HDGC   Diffuse Gastric  <1% ~80%   Breast 12% 41-60%       Additional Genes    KANDIS  KANDIS is a moderate-risk breast cancer gene. Women who have a mutation in KANDIS can have between a 2-4 fold increased risk for breast cancer compared to the general population8. KANDIS mutations have also been associated with increased risk for pancreatic cancer between 5-10%9. Individuals who inherit two KANDIS mutations have a condition called ataxia-telangiectasia (AT).  This rare autosomal recessive condition affects the nervous system  and immune system, and is associated with progressive cerebellar ataxia beginning in childhood. Individuals with ataxia-telangiectasia often have a weakened immune system and have an increased risk for childhood cancers.    PALB2  Mutations in PALB2 have been shown to increase the risk of breast cancer up to 41-60% in some families; where individuals fall within this risk range is dependent upon family [id number]. PALB2 mutations have also been associated with increased risk for pancreatic cancer between 5-10%.  Individuals who inherit two PALB2 mutations--one from their mother and one from their father--have a condition called Fanconi Anemia.  This rare autosomal recessive condition is associated with short stature, developmental delay, bone marrow failure, and increased risk for childhood cancers.    CHEK2   CHEK2 is a moderate-risk breast cancer gene.  Women who have a mutation in CHEK2 have around a 2-4 fold increased risk for breast cancer compared to the general population, and this risk may be higher depending upon family history.11,12,13 The risk of colon cancer may be twice as high as the general population risk of colon cancer of 5%. Mutations in CHEK2 have also been shown to increase the risk of other cancers, including prostate, however these cancer risks are currently not well understood.    BRIP1, RAD51C and RAD51D  Mutations in RAD51C and RAD51D have been shown to increase the risk of ovarian cancer and breast cancer 14,. Mutations in BRIP1 have been shown to increase the risk of ovarian cancer and possibly female breast cancer 15 .       Lifetime Cancer Risk    General Population        BRIP1   RAD51C  RAD51D   Breast 12% Not well defined 20-40% 20-40%   Ovarian 1-2% 5-15% 10-15% 10-20%     ______________________________________________________________  Inheritance  All of the cancer syndromes reviewed above are inherited in an autosomal dominant pattern.  This means that if a parent has a mutation,  each of their children will have a 50% chance of inheriting that same mutation. Therefore, each child --male or female-- would have a 50% chance of being at increased risk for developing cancer.    Image obtained from Genetics Home Reference, 2013     Mutations in some genes can occur de paige, which means that a person s mutation occurred for the first time in them and was not inherited from a parent.  Now that they have the mutation, however, it can be passed on to future generations.    Genetic Testing  Genetic testing involves a blood test and will look for any harmful mutations that are associated with increased cancer risk.  If possible, it is recommended that the person(s) who has had cancer be tested before other family members.  That person will give us the most useful information about whether or not a specific gene is associated with the cancer in the family.    Results  There are three possible results of genetic testing:  Positive--a harmful mutation was identified in one or more of the genes  Negative--no mutations were identified in any of the genes tested  Variant of unknown significance--a variation in one of the genes was identified, but it is unclear how this impacts cancer risk in the family    Advantages and Disadvantages   There are advantages and disadvantages to genetic testing.    Advantages  May clarify your cancer risk  Can help you make medical decisions  May explain the cancers in your family  May give useful information to your family members (if you share your results)    Disadvantages  Possible negative emotional impact of learning about inherited cancer risk  Uncertainty in interpreting a negative test result in some situations  Possible genetic discrimination concerns (see below)    Genetic Information Nondiscrimination Act (PAVAN)  The Genetic Information Nondiscrimination Act of 2008 (PAVAN) is a federal law that protects individuals from health insurance or employment discrimination  based on a genetic test result alone (with some exceptions, including employers with fewer than 15 employees, and ).  Although rare, PAVAN  does not cover discrimination protections in terms of life insurance, long term care, or disability insurances.  Visit the National Human [employer] [place] website to learn more.    Reducing Cancer Risk  All of the genes described in this handout have nationally recognized cancer screening guidelines that would be recommended for individuals who test positive.  In addition to increased cancer screening, surgeries may be offered or recommended to reduce cancer risk.  Recommendations are based upon an individual s genetic test result as well as their personal and family history of cancer.    Questions to Think About Regarding Genetic Testing:  What effect will the test result have on me and my relationship with my family members if I have an inherited gene mutation?  If I don t have a gene mutation?  Should I share my test results, and how will my family react to this news, which may also affect them?  Are my children ready to learn new information that may one day affect their own health?    Hereditary Cancer Resources    FORCE: Facing Our Risk of Cancer Empowered facingourrisk.org   Bright Pink bebrightpink.org   Li-Fraumeni Syndrome Association lfsassociation.org   PTEN World PTENworld.com   No stomach for cancer, Inc. nostomachforcancer.org   Stomach cancer relief network Scrnet.org   Collaborative Group of the Americas on Inherited Colorectal Cancer (CGA) cgaicc.com    Cancer Care cancercare.org   American Cancer Society (ACS) cancer.org   National Cancer [place] (NCI) cancer.gov     Please call us if you have any questions or concerns.   Cancer Risk Management Program [phone] ([phone])  Alfredo Edouard, [place] [facility] [place]  [phone]  Annabel Palma, MS, [facility] [place] [phone]  Natividad Rosas, MS, [facility] [place]  [phone]  Celi Hwang, MS, [facility] [place]  [phone]  Rin Orr,  [place], [facility] [place]  [phone]  Christal Zepead, [place], [facility] [place] [phone]  Lois Liu, [place], [facility] [place] [phone]    References  Hortensia Munroe PDP, Abigail S, Catia CARRIZALES, Luke JE, Rosenda JL, Yi N, Ashley H, Logan O, Armani A, Pasini B, Radimason P, Manalexander S, Dulce Maria DM, Appiah N, Sri E, Cait H, Hanna E, Evan J, Gronrichard J, Boogie B, Tulinius H, Thorlacius S, Eerola H, Nevanlinna H, Faisal K, Vishal OP. Average risks of breast and ovarian cancer associated with BRCA1 or BRCA2 mutations detected in case series unselected for family history: a combined analysis of 222 studies. Am J Hum Larisa. 2003;72:1117-30.  Edith N, Anna M, Stacy G.  BRCA1 and BRCA2 Hereditary Breast and Ovarian Cancer. Gene Reviews online. 2013.  Asael YC, Marianne S, Cyndee G, Walker S. Breast cancer risk among male BRCA1 and BRCA2 mutation carriers. J Natl Cancer Inst. 2007;99:1811-4.  Vinnie FERGUSON, Fabiola I, Kermit J, Jossie E, Juno ER, Jabari F. Risk of breast cancer in male BRCA2 carriers. J Med Larisa. 2010;47:710-1.  National Comprehensive Cancer Network. Clinical practice guidelines in oncology, colorectal cancer screening. Available online (registration required). 2015.  Teodoro MH, Cheo J, Jeovanny J, Tyrone BHATTI, Brea MS, Eng C. Lifetime cancer risks in individuals with germline PTEN mutations. Clin Cancer Res. 2012;18:400-7.  Migdalia R. Cowden Syndrome: A Critical Review of the Clinical Literature. J Larisa . 2009:18:13-27.  Winnie ANGELES, Luis Manuel HERR, Deborah S, Nisreen P, Cyn T, Hilario M, Sacha B, Kourtney H, Adin R, Dick K, Davida L, Vinnie FERGUSON, Dulce Maria HERR, Hector DF, Trang MR, The Breast Cancer Susceptibility Collaboration (UK) & Mame OKEEFE. KANDIS mutations that cause ataxia-telangiectasia are breast cancer susceptibility alleles. Nature Genetics. 2006;38:873-875  Terry N , Mackenzie Y, Ivy J, Radha L, Rocio CRYSTAL , Leanna ML, Loly S, Kathryn AG, Lolly S, Janeen ML, Brad J , Fausto R, Lana LOGAN, Vlad  JR, Timbo VE, Yaya M, Vosaschastein B, Toribio N, Сергей RH, Pacheco KW, and Alex AP. KANDIS mutations in patients with hereditary pancreatic cancer. Cancer Discover. 2012;2:41-46  Emmy VAZQUEZ., et al. Breast-Cancer Risk in Families with Mutations in PALB2. NEJM. 2014; 371(6):497-506.  CHEK2 Breast Cancer Case-Control Consortium. CHEK2*1100delC and susceptibility to breast cancer: A collaborative analysis involving 10,860 breast cancer cases and 9,065 controls from 10 studies. Am J Hum Larisa, 74 (2004), pp. 9649-1776  Vladislav T, Makayla S, Liz K, et al. Spectrum of Mutations in BRCA1, BRCA2, CHEK2, and TP53 in Families at High Risk of Breast Cancer. FUAD. 2006;295(12):3928-7783.   Martinez C, Lola D, Noe ANGELES, et al. Risk of breast cancer in women with a CHEK2 mutation with and without a family history of breast cancer. J Clin Oncol. 2011;29:9521-1112.  Song H, Ashvins E, Ramus SJ, et al. Contribution of germline mutations in the RAD51B, RAD51C, and RAD51D genes to ovarian cancer in the population. J Clin Oncol. 2015;33(26):6988-4428. Doi:10.1200/JCO.2015.61.2408.  Yessica T, Hafsa DF, Fernando P, et al. Mutations in BRIP1 confer high risk of ovarian cancer. Rose Mary Larisa. 2011;43(11):4854-3740. doi:10.1038/ng.955.

## 2024-11-23 ENCOUNTER — HEALTH MAINTENANCE LETTER (OUTPATIENT)
Age: 44
End: 2024-11-23

## 2025-03-18 NOTE — PROGRESS NOTES
"IUD Removal:  SUBJECTIVE:  Nisreen Mohan is a 45 year old,  here today for IUD removal and initiation of hormone replacement therapy for management of perimenopausal symptoms.     - Reports onset of perimenopausal symptoms that started about 5 months ago including irritability, dry skin, dry skin in ear, insomnia, vaginal dryness, brain fog, and night sweats (up to 5 per night). Has also noticed up to 15lb weight gain. Not feeling herself.   - LMP 25, otherwise prior to this cycle had been occurring monthly but has been increasing in amount of bleeding (although Mirena IUD  6 months ago).   - Current Mirena IUD was placed on 10/20/16. Not using alternative contraception.   - Next pap smear due ; due for preventive health visit, last 2023    Nisreen is interested in treatment for her perimenopausal symptoms.     Past Medical History:   Diagnosis Date    Anxiety     Depression     Menarche age    Cycles Q    Uncomplicated asthma      Past Surgical History:   Procedure Laterality Date    COLONOSCOPY N/A 2024    Procedure: COLONOSCOPY, WITH POLYPECTOMY AND BIOPSY;  Surgeon: Nav Harp MD;  Location: [facility]    HC TOOTH EXTRACTION W/FORCEP      no troubles with sedation     Family History   Problem Relation Age of Onset    Breast Cancer Mother         lymphectomy on right side    Cancer Father 63        colon, rectal    Hypertension Father         takes medication    Prostate Cancer Father     Cardiovascular Maternal Grandmother         quad bypass    Cerebrovascular Disease Paternal Grandfather     Ovarian Cancer Paternal Aunt 68        stage 4    Other Cancer Other         Stage 4 Ovarian Cancer      No Known Allergies    Medications: none    Objective:  /82   Pulse 74   Ht 1.638 m (5' 4.5\")   LMP 2025 (Exact Date)   Breastfeeding No   BMI 25.13 kg/m    General: pleasant female in no acute distress  Psych: normal mentation, well oriented  Respiratory:  " Unlabored breathing  Musculoskeletal: no gross deformities  Pelvic Exam:  Vulva: No external lesions, normal hair distribution, no adenopathy  Vagina: Moist, pink, no abnormal discharge, well rugated, no lesions  Cervix: parous, smooth, pink, no visible lesions; IUD strings visible  Rectal exam: Normal anus    UPT: negative    IUD Removal Procedure Note:  After verbal consent was obtained from the patient, IUD strings were grasped with ring forcep and IUD easily removed intact with minimal patient discomfort noted.  No bleeding noted.    Assessment/ Plan:  1. Encounter for IUD removal (Primary)  - hCG qual urine POCT - negative today  - See procedure note below - removed without complication  - Call if bleeding, pain or fever occur. and Birth control counseling given.    2. Encounter for BCP (birth control pills) initial prescription  3. Perimenopausal symptoms  - levonorgestrel-ethinyl estradiol (AVIANE) 0.1-20 MG-MCG tablet; Take 1 tablet by mouth daily.  Dispense: 84 tablet; Refill: 0  - Counseled pt on options for management of perimenopausal symptoms. Pt still needs contraception given that she has only been 2 months without a period. No CI to estrogen. Interested in trial of COCs. Discussed which of her symptoms may be related to menopause vs aging process.  Recommend 2 month trial of COCs and will follow-up at that time to discuss efficacy of symptom management and for annual exam. If ineffective at managing VMS, may consider HT and insertion of LNG IUD at that time to be used as progesterone component. Due for lipids, which will be ordered at that time. If mood symptoms are not improved, will consider restarting restarting previous selective serotonin reuptake inhibitor. Pt agreeable to this plan.   - Counseled on need for back-up method x 7 days.    - Discussed additional methods of weight management seen in the perimenopausal period including incorporating more weight training, increased exercise and diet  modification.      Pt left, stable. She expressed understanding and agreement with the plan for care.     I, Trish Trevino, completed the PFSH and ROS. I then acted as a scribe for XIOMARA Wynne, for the remainder of the visit.  Trish Trevino, XIOMARA, AGUILAR Student     I agree with the PFSH and ROS as completed by the XIOMARA Student, except for changes made by me.  The remainder of the encounter was performed by me and scribed by the XIOMARA Student.  The scribed note accurately reflects my personal services and decisions made by me.  Philomena Ochoa DNP, CARSON, XIOMARA

## 2025-03-20 ENCOUNTER — OFFICE VISIT (OUTPATIENT)
Dept: OBGYN | Facility: CLINIC | Age: 45
End: 2025-03-20
Attending: NURSE PRACTITIONER
Payer: COMMERCIAL

## 2025-03-20 VITALS
HEART RATE: 74 BPM | BODY MASS INDEX: 25.13 KG/M2 | DIASTOLIC BLOOD PRESSURE: 82 MMHG | HEIGHT: 65 IN | SYSTOLIC BLOOD PRESSURE: 119 MMHG

## 2025-03-20 DIAGNOSIS — Z30.432 ENCOUNTER FOR IUD REMOVAL: Primary | ICD-10-CM

## 2025-03-20 DIAGNOSIS — N95.1 PERIMENOPAUSAL SYMPTOMS: ICD-10-CM

## 2025-03-20 DIAGNOSIS — Z30.011 ENCOUNTER FOR BCP (BIRTH CONTROL PILLS) INITIAL PRESCRIPTION: ICD-10-CM

## 2025-03-20 LAB
HCG UR QL: NEGATIVE
INTERNAL QC OK POCT: NORMAL
POCT KIT EXPIRATION DATE: NORMAL
POCT KIT LOT NUMBER: NORMAL

## 2025-03-20 PROCEDURE — 99213 OFFICE O/P EST LOW 20 MIN: CPT | Performed by: NURSE PRACTITIONER

## 2025-03-20 PROCEDURE — 81025 URINE PREGNANCY TEST: CPT | Performed by: NURSE PRACTITIONER

## 2025-03-20 PROCEDURE — 58301 REMOVE INTRAUTERINE DEVICE: CPT | Performed by: NURSE PRACTITIONER

## 2025-03-20 RX ORDER — LEVONORGESTREL/ETHIN.ESTRADIOL 0.1-0.02MG
1 TABLET ORAL DAILY
Qty: 84 TABLET | Refills: 0 | Status: SHIPPED | OUTPATIENT
Start: 2025-03-20

## 2025-03-20 NOTE — LETTER
3/20/2025       RE: Nisreen Mohan  [address] [facility] [address]     Dear Colleague,    Thank you for referring your patient, Nisreen Mohan, to the [facility] [place] at [facility]. Please see a copy of my visit note below.    IUD Removal:  SUBJECTIVE:  Nisreen Mohan is a 45 year old,  here today for IUD removal and initiation of hormone replacement therapy for management of perimenopausal symptoms.     - Reports onset of perimenopausal symptoms that started about 5 months ago including irritability, dry skin, dry skin in ear, insomnia, vaginal dryness, brain fog, and night sweats (up to 5 per night). Has also noticed up to 15lb weight gain. Not feeling herself.   - LMP 25, otherwise prior to this cycle had been occurring monthly but has been increasing in amount of bleeding (although Mirena IUD  6 months ago).   - Current Mirena IUD was placed on 10/20/16. Not using alternative contraception.   - Next pap smear due ; due for preventive health visit, last 2023    Nisreen is interested in treatment for her perimenopausal symptoms.     Past Medical History:   Diagnosis Date     Anxiety      Depression      Menarche age    Cycles Q     Uncomplicated asthma      Past Surgical History:   Procedure Laterality Date     COLONOSCOPY N/A 2024    Procedure: COLONOSCOPY, WITH POLYPECTOMY AND BIOPSY;  Surgeon: Nav Harp MD;  Location:  GI     HC TOOTH EXTRACTION W/FORCEP      no troubles with sedation     Family History   Problem Relation Age of Onset     Breast Cancer Mother         lymphectomy on right side     Cancer Father 63        colon, rectal     Hypertension Father         takes medication     Prostate Cancer Father      Cardiovascular Maternal Grandmother         quad bypass     Cerebrovascular Disease Paternal Grandfather      Ovarian Cancer Paternal Aunt 68        stage 4     Other  "Cancer Other         Stage 4 Ovarian Cancer      No Known Allergies    Medications: none    Objective:  /82   Pulse 74   Ht 1.638 m (5' 4.5\")   LMP 01/17/2025 (Exact Date)   Breastfeeding No   BMI 25.13 kg/m    General: pleasant female in no acute distress  Psych: normal mentation, well oriented  Respiratory:  Unlabored breathing  Musculoskeletal: no gross deformities  Pelvic Exam:  Vulva: No external lesions, normal hair distribution, no adenopathy  Vagina: Moist, pink, no abnormal discharge, well rugated, no lesions  Cervix: parous, smooth, pink, no visible lesions; IUD strings visible  Rectal exam: Normal anus    UPT: negative    IUD Removal Procedure Note:  After verbal consent was obtained from the patient, IUD strings were grasped with ring forcep and IUD easily removed intact with minimal patient discomfort noted.  No bleeding noted.    Assessment/ Plan:  1. Encounter for IUD removal (Primary)  - hCG qual urine POCT - negative today  - See procedure note below - removed without complication  - Call if bleeding, pain or fever occur. and Birth control counseling given.    2. Encounter for BCP (birth control pills) initial prescription  3. Perimenopausal symptoms  - levonorgestrel-ethinyl estradiol (AVIANE) 0.1-20 MG-MCG tablet; Take 1 tablet by mouth daily.  Dispense: 84 tablet; Refill: 0  - Counseled pt on options for management of perimenopausal symptoms. Pt still needs contraception given that she has only been 2 months without a period. No CI to estrogen. Interested in trial of COCs. Discussed which of her symptoms may be related to menopause vs aging process.  Recommend 2 month trial of COCs and will follow-up at that time to discuss efficacy of symptom management and for annual exam. If ineffective at managing VMS, may consider HT and insertion of LNG IUD at that time to be used as progesterone component. Due for lipids, which will be ordered at that time. If mood symptoms are not improved, " will consider restarting restarting previous selective serotonin reuptake inhibitor. Pt agreeable to this plan.     - Discussed additional methods of weight management seen in the perimenopausal period including incorporating more weight training, increased exercise and diet modification.      Pt left, stable. She expressed understanding and agreement with the plan for care.     I, Trsih Trevino, completed the PFSH and ROS. I then acted as a scribe for XIOMARA Wynne, for the remainder of the visit.  XIOMARA Olson, AGUILAR Student     I agree with the PFSH and ROS as completed by the XIOMARA Student, except for changes made by me.  The remainder of the encounter was performed by me and scribed by the XIOMARA Student.  The scribed note accurately reflects my personal services and decisions made by me.  Philomena Ochoa DNP, CARSON, XIOMARA             Again, thank you for allowing me to participate in the care of your patient.      Sincerely,    CARSON Valverde CNP

## 2025-05-21 NOTE — PROGRESS NOTES
"  Progress Note    SUBJECTIVE:  Nisreen Mohan is an 45 year old, , who requests an Annual Preventive Exam.     Concerns today include:   Menstrual periods: Mirena IUD was removed 3/20/2025 due to being . Period prior to this was 2025. Then next period was 2025 (exact date). She started COCs in March to help manage her perimenopausal symptoms.  Bleeding in May occurred during the withdrawal / placebo week. Denies heavy or prolonged bleeding. Wondering what to expect for menses going forward  Mental health: Restarted Sertraline 50mg after her last appointment. This has been helping with mood stability.   Perimenopausal symptoms: Started COCs to help with perimenopausal symptoms in 2025. Skin continues to feel dryer, but not as \"aggressive.\" Feels alleviated a little bit. Night sweats had been occurring up to 5 per night and these have resolved. Brain fog: improved; Vaginal dryness: improved; Nisreen has also started a vaginal estrogen cream which she purchased from an [employer] company - she is unsure of the dosage; has found this helpful. Feeling more like herself. Happy with this management plan.   Trouble losing weight: Up about 20lbs from when she had her children; desires to get back to 130-135lb, as she feels better at this weight. Walks her dog about 3 miles most mornings; doing a weight lifting program at home as well. Trying to log her food. Prioritizing protein and fiber.   Fam hx of cancer: Nisreen has a significant family history of cancer (mother- breast cancer in her early 50s; paternal aunt had stage 4 ovarian cancer; father had prostate and colorectal cancer).  Her sister did genetic testing and it was negative.  Nisreen did genetic counseling within the last year and opted not to pursue genetic testing.      Due for lipid profile    Last pap smear:  NIL, HPV negative  - No hx of abnormal pap smears    Last mammogram: 2023 - DUE    Colonoscopy: 2024 --> repeat in 5 yrs " (due 2029)    Due for Tdap in 7/2026    Sexual hx: feeling well; no concerns    Answers submitted by the patient for this visit:  Patient Health Questionnaire (G7) (Submitted on 5/22/2025)  YINA 7 TOTAL SCORE: 2    Menstrual History:      2/2/2024     6:08 AM 3/20/2025     8:20 AM 5/22/2025     9:00 AM   Menstrual History   LAST MENSTRUAL PERIOD 1/19/2024 1/17/2025 5/6/2025     Last Mammogram:   MA Screening Digital Bilateral  Result Date: 8/28/2023  Narrative: BILATERAL FULL FIELD DIGITAL SCREENING MAMMOGRAM Performed on: 8/28/23 Compared to: 04/07/2022 and 02/16/2021 Technique: This study was evaluated with the assistance of Computer-Aided Detection. Findings: The breasts are extremely dense, which lowers the sensitivity of mammography.  There is no radiographic evidence of malignancy.     MA Screening Digital Bilateral  Result Date: 4/7/2022  Narrative: BILATERAL FULL FIELD DIGITAL SCREENING MAMMOGRAM Performed on: 4/7/22 Compared to: 02/16/2021 Technique: This study was evaluated with the assistance of Computer-Aided Detection. Findings: The breasts are extremely dense, which lowers the sensitivity of mammography.  There is no radiographic evidence of malignancy. IMPRESSION: ACR BI-RADS Category 1: Negative RECOMMENDED FOLLOW-UP: Annual routine screening mammogram The results and recommendations of this examination will be communicated to the patient.        Last Colonoscopy:  Results for orders placed or performed during the hospital encounter of 02/02/24   COLONOSCOPY    Collection Time: 02/02/24  7:13 AM   Result Value Ref Range    COLONOSCOPY       [facility]  [address] [place] [address] [phone]     Endoscopy Department  _______________________________________________________________________________  Patient Name: Nisreen Mohan          Procedure Date: 2/2/2024 7:13 AM  MRN: [id number]                       Account Number: [id number]  YOB: 1980               Admit Type: Outpatient  Age: 43                               Room: [place] [address]  Gender: Female                        Note Status: Finalized  Attending MD: NARGIS HERNANDEZ MD,   Total Sedation Time:   _______________________________________________________________________________     Procedure:             Colonoscopy  Indications:           Screening patient at increased risk: Family history of                          1st-degree relative with colorectal cancer at age 60                          years (or older)  Providers:             NARGIS HERNANDEZ MD, Jalil Gauthier RN  Referring MD:           GALEN DEVI  Medicines:             Fentanyl 100 micrograms IV, Midazolam 2 mg IV  Complications:         No immediate complications.  _______________________________________________________________________________  Procedure:             Pre-Anesthesia Assessment:                         - Prior to the procedure, a History and Physical was                          performed, and patient medications and allergies were                          reviewed. The patient is competent. The risks and                          benefits of the procedure and the sedation options and                          risks were discussed with the patient. All questions                          were answered and informed consent was obtained.                          Patient identification and proposed procedure were                          verified by the physician and the nurse in the                          pre-procedure area in the endoscopy suite. Mental                          Status  Examination: alert and oriented. Airway                          Examination: normal oropharyngeal airway and neck                          mobility. Respiratory Examination: clear to                          auscultation. CV Examination: normal. Prophylactic                          Antibiotics: The patient does  not require prophylactic                          antibiotics. Prior Anticoagulants: The patient has                          taken no anticoagulant or antiplatelet agents. ASA                          Grade Assessment: II - A patient with mild systemic                          disease. After reviewing the risks and benefits, the                          patient was deemed in satisfactory condition to                          undergo the procedure. The anesthesia plan was to use                          moderate sedation / analgesia (conscious sedation).                          Immediately prior to administration of medications,                          the patient was re-as sessed for adequacy to receive                          sedatives. The heart rate, respiratory rate, oxygen                          saturations, blood pressure, adequacy of pulmonary                          ventilation, and response to care were monitored                          throughout the procedure. The physical status of the                          patient was re-assessed after the procedure.                         After obtaining informed consent, the colonoscope was                          passed under direct vision. Throughout the procedure,                          the patient's blood pressure, pulse, and oxygen                          saturations were monitored continuously. The                          Colonoscope was introduced through the anus and                          advanced to the cecum, identified by appendiceal                          orifice and ileocecal valve. The colonoscopy was                          performed without difficulty. The patient tolera ricardo                          the procedure well. The quality of the bowel                          preparation was evaluated using the BBPS (Nash Bowel                          Preparation Scale) with scores of: Right Colon = 3,                           Transverse Colon = 3 and Left Colon = 3 (entire mucosa                          seen well with no residual staining, small fragments                          of stool or opaque liquid). The total BBPS score                          equals 9.                                                                                   Findings:       The perianal and digital rectal examinations were normal.       A 7 mm polyp was found in the cecum. The polyp was sessile. The polyp        was removed with a cold snare. Resection and retrieval were complete.        Verification of patient identification for the specimen was done.        Estimated blood loss was minimal.       The exam was otherwise normal throughout the examined colon.       The re troflexed view of the distal rectum and anal verge was normal and        showed no anal or rectal abnormalities.                                                                                   Impression:            - One 7 mm polyp in the cecum, removed with a cold                          snare. Resected and retrieved.                         - The distal rectum and anal verge are normal on                          retroflexion view.  Recommendation:        - Discharge patient to home (ambulatory).                         - Patient has a contact number available for                          emergencies. The signs and symptoms of potential                          delayed complications were discussed with the patient.                          Return to normal activities tomorrow. Written                          discharge instructions were provided to the patient.                         - Await pathology results.                         - Repeat colonoscopy in 5 years for surveilla nce based                          on pathology results.                         - Return to primary care physician.                                                                                        _____________________________  NARGIS HARP MD  2024 8:07:28 AM  I was physically present for the entire viewing portion of the exam.  __________________________  Signature of teaching physician  [id number] HARP MD  Number of Addenda: 0    Note Initiated On: 2024 7:13 AM  Scope In: 7:43:02 AM  Scope Out: 8:05:14 AM           HISTORY:  Current Outpatient Medications   Medication Sig Dispense Refill    estradiol (ESTRACE) 0.1 MG/GM vaginal cream Place vaginally.      estradiol (ESTRACE) 0.1 MG/GM vaginal cream Place 1 g vaginally twice a week. 42.5 g 1    levonorgestrel-ethinyl estradiol (AVIANE) 0.1-20 MG-MCG tablet Take 1 tablet by mouth daily. 84 tablet 3    sertraline (ZOLOFT) 50 MG tablet Take 1 tablet (50 mg) by mouth daily. 90 tablet 3    VITAMIN D, CHOLECALCIFEROL, PO Take by mouth daily.       No current facility-administered medications for this visit.     No Known Allergies  Immunization History   Administered Date(s) Administered    COVID-19 12+ (MODERNA) 10/29/2024    COVID-19 12+ (Pfizer) 10/09/2023    COVID-19 Bivalent 12+ (Pfizer) 2022    COVID-19 MONOVALENT 12+ (Pfizer) 2021, 2021, 2021    Influenza (IIV3) PF 2012    Influenza Vaccine >6 months,quad, PF 10/20/2016, 2021    TDAP Vaccine (Boostrix) 11/15/2012, 2016       OB History    Para Term  AB Living   3 2 2 0 1 2   SAB IAB Ectopic Multiple Live Births   0 0 0 0 2     Past Medical History:   Diagnosis Date    Anxiety     Depression     Menarche age    Cycles Q    Uncomplicated asthma      Past Surgical History:   Procedure Laterality Date    COLONOSCOPY N/A 2024    Procedure: COLONOSCOPY, WITH POLYPECTOMY AND BIOPSY;  Surgeon: Nargis Harp MD;  Location: [facility]    HC TOOTH EXTRACTION W/FORCEP      no troubles with sedation     Family History   Problem Relation Age of Onset    Breast Cancer Mother         lymphectomy on right side     Cancer Father 63        colon, rectal    Hypertension Father         takes medication    Prostate Cancer Father     Cardiovascular Maternal Grandmother         quad bypass    Cerebrovascular Disease Paternal Grandfather     Ovarian Cancer Paternal Aunt 68        stage 4    Other Cancer Other         Stage 4 Ovarian Cancer     Social History     Socioeconomic History    Marital status:      Spouse name: Grayson    Number of children: 1    Years of education: None    Highest education level: None   Occupational History    Occupation:      Comment: full time   Tobacco Use    Smoking status: Never    Smokeless tobacco: Never   Vaping Use    Vaping status: Never Used   Substance and Sexual Activity    Alcohol use: Yes     Alcohol/week: 2.0 - 3.0 standard drinks of alcohol     Comment: 1 beer or wine per day 3-5 nights a week    Drug use: No    Sexual activity: Yes     Partners: Male     Birth control/protection: I.U.D., None     Comment: Paragard   Other Topics Concern     Service No    Blood Transfusions No    Caffeine Concern No    Occupational Exposure No    Hobby Hazards No    Sleep Concern Yes     Comment: trouble staying asleep, taking sleep     Stress Concern Yes     Comment: new job, considering marriage, not feeling overwhelmed    Weight Concern No    Special Diet No    Back Care No    Exercise Yes     Comment: runner, cross-fit      Bike Helmet Yes    Seat Belt Yes    Self-Exams Yes   Social History Narrative    How much exercise per week? 3 days    How much calcium per day? 4-5 servings       How much caffeine per day? 1 cup coffee    How much vitamin D per day?  No supplements    Do you/your family wear seatbelts?  Yes    Do you/your family use safety helmets? Yes    Do you/your family use sunscreen? Yes    Do you/your family keep firearms in the home? No    Do you/your family have a smoke detector(s)? Yes        Do you feel safe in your home? Yes    Has anyone ever touched you in  "an unwanted manner? No     Explain         December 22, 2014 Kaleb Betancourt [place]ON    reveiewed cmcmki m lpn 1-         Social Drivers of Health     Interpersonal Safety: Not At Risk (8/24/2023)    Humiliation, Afraid, Rape, and Kick questionnaire     Fear of Current or Ex-Partner: No     Emotionally Abused: No     Physically Abused: No     Sexually Abused: No       ROS  [unfilled]      10/20/2016    10:23 AM 2/16/2021    11:16 AM 8/24/2023    11:36 AM   PHQ-9 SCORE   PHQ-9 Total Score 1  2 2       Data saved with a previous flowsheet row definition         2/12/2021     4:51 PM 8/24/2023    11:36 AM 5/22/2025     8:53 AM   YINA-7 SCORE   Total Score 3 (minimal anxiety)  2 (minimal anxiety)   Total Score 3 0 2        Patient-reported         EXAM:  Blood pressure 137/88, pulse 73, height 1.638 m (5' 4.5\"), weight 70.1 kg (154 lb 9.6 oz), last menstrual period 05/06/2025, not currently breastfeeding. Body mass index is 26.13 kg/m .  General - pleasant female in no acute distress.  Skin - no suspicious lesions or rashes  EENT-  euthyroid with out palpable nodules  Neck - supple without lymphadenopathy.  Lungs - clear to auscultation bilaterally.  Heart - regular rate and rhythm without murmur.  Abdomen - soft, nontender, nondistended, no masses or organomegaly noted.  Musculoskeletal - no gross deformities.  Neurological - normal strength, sensation, and mental status.    Breast Exam:  Breast: Without visible skin changes. No dimpling or lesions seen.   Breasts supple, non-tender with palpation, no dominant mass, nodularity, or nipple discharge noted bilaterally. Axillary nodes negative.        ASSESSMENT/ PLAN:  1. Visit for preventive health examination (Primary)  - Lipid Profile; Future  - TSH with free T4 reflex; Future  - Hemoglobin A1c; Future  - MA Screening Bilateral w/ Robin; Future  - Pap smear due 2026  - Colonoscopy due 2029   - Tdap due 2026    2. Screening for lipid disorders  - Lipid Profile; Future    3. " Screening for diabetes mellitus  - Hemoglobin A1c; Future    4. Encounter for screening mammogram for malignant neoplasm of breast  5. Family history of breast cancer  - MA Screening Bilateral w/ Robin; Future  Recommended Nisreen re-connect with the genetic counselor she met with to share that she does not desire to pursue genetic testing at this time and ask for risk calculations and subsequent screening recommendations. Discussed that she may qualify for high risk breast cancer screening, for example, with MRI and Mammogram alternating every 6 months.      6. Screening for thyroid disorder  - TSH with free T4 reflex; Future    7. Panic attack  - sertraline (ZOLOFT) 50 MG tablet; Take 1 tablet (50 mg) by mouth daily.  Dispense: 90 tablet; Refill: 3    8. Surveillance of previously prescribed contraceptive pill  - levonorgestrel-ethinyl estradiol (AVIANE) 0.1-20 MG-MCG tablet; Take 1 tablet by mouth daily.  Dispense: 84 tablet; Refill: 3  Counseled on common bleeding patterns in the perimenopausal time period while taking COCs.  - Recommend BP monitoring given elevation today after starting COCs in March 2025.    9. Vaginal dryness  - estradiol (ESTRACE) 0.1 MG/GM vaginal cream; Place 1 g vaginally twice a week.  Dispense: 42.5 g; Refill: 1  Nisreen prefers to switch her vaginal estrogen to this prescriber so that all of her hormones are being managed together.     Additional teaching done at this visit regarding self breast awareness, exercise, perimenopause, mental health, and weight/diet.    Return to clinic in one year.  Follow-up as needed.    Philomena Ochoa, DNP, APRN, WHNP

## 2025-05-22 ENCOUNTER — LAB (OUTPATIENT)
Dept: LAB | Facility: CLINIC | Age: 45
End: 2025-05-22
Attending: NURSE PRACTITIONER
Payer: COMMERCIAL

## 2025-05-22 ENCOUNTER — OFFICE VISIT (OUTPATIENT)
Dept: OBGYN | Facility: CLINIC | Age: 45
End: 2025-05-22
Attending: NURSE PRACTITIONER
Payer: COMMERCIAL

## 2025-05-22 ENCOUNTER — RESULTS FOLLOW-UP (OUTPATIENT)
Dept: OBGYN | Facility: CLINIC | Age: 45
End: 2025-05-22

## 2025-05-22 VITALS
BODY MASS INDEX: 25.76 KG/M2 | DIASTOLIC BLOOD PRESSURE: 88 MMHG | HEIGHT: 65 IN | WEIGHT: 154.6 LBS | SYSTOLIC BLOOD PRESSURE: 137 MMHG | HEART RATE: 73 BPM

## 2025-05-22 DIAGNOSIS — N89.8 VAGINAL DRYNESS: ICD-10-CM

## 2025-05-22 DIAGNOSIS — Z00.00 VISIT FOR PREVENTIVE HEALTH EXAMINATION: Primary | ICD-10-CM

## 2025-05-22 DIAGNOSIS — Z13.29 SCREENING FOR THYROID DISORDER: ICD-10-CM

## 2025-05-22 DIAGNOSIS — F41.0 PANIC ATTACK: ICD-10-CM

## 2025-05-22 DIAGNOSIS — Z00.00 VISIT FOR PREVENTIVE HEALTH EXAMINATION: ICD-10-CM

## 2025-05-22 DIAGNOSIS — Z13.220 SCREENING FOR LIPID DISORDERS: ICD-10-CM

## 2025-05-22 DIAGNOSIS — Z30.41 SURVEILLANCE OF PREVIOUSLY PRESCRIBED CONTRACEPTIVE PILL: ICD-10-CM

## 2025-05-22 DIAGNOSIS — Z80.3 FAMILY HISTORY OF MALIGNANT NEOPLASM OF BREAST: ICD-10-CM

## 2025-05-22 DIAGNOSIS — Z13.1 SCREENING FOR DIABETES MELLITUS: ICD-10-CM

## 2025-05-22 DIAGNOSIS — Z12.31 ENCOUNTER FOR SCREENING MAMMOGRAM FOR MALIGNANT NEOPLASM OF BREAST: ICD-10-CM

## 2025-05-22 LAB
CHOLEST SERPL-MCNC: 163 MG/DL
EST. AVERAGE GLUCOSE BLD GHB EST-MCNC: 111 MG/DL
FASTING STATUS PATIENT QL REPORTED: NO
HBA1C MFR BLD: 5.5 %
HDLC SERPL-MCNC: 62 MG/DL
LDLC SERPL CALC-MCNC: 75 MG/DL
NONHDLC SERPL-MCNC: 101 MG/DL
TRIGL SERPL-MCNC: 132 MG/DL
TSH SERPL DL<=0.005 MIU/L-ACNC: 1.82 UIU/ML (ref 0.3–4.2)

## 2025-05-22 PROCEDURE — 3075F SYST BP GE 130 - 139MM HG: CPT | Performed by: NURSE PRACTITIONER

## 2025-05-22 PROCEDURE — 3044F HG A1C LEVEL LT 7.0%: CPT | Performed by: NURSE PRACTITIONER

## 2025-05-22 PROCEDURE — 3048F LDL-C <100 MG/DL: CPT | Performed by: NURSE PRACTITIONER

## 2025-05-22 PROCEDURE — 3079F DIAST BP 80-89 MM HG: CPT | Performed by: NURSE PRACTITIONER

## 2025-05-22 PROCEDURE — 36415 COLL VENOUS BLD VENIPUNCTURE: CPT

## 2025-05-22 PROCEDURE — 84443 ASSAY THYROID STIM HORMONE: CPT

## 2025-05-22 PROCEDURE — 99396 PREV VISIT EST AGE 40-64: CPT | Performed by: NURSE PRACTITIONER

## 2025-05-22 PROCEDURE — 99213 OFFICE O/P EST LOW 20 MIN: CPT | Performed by: NURSE PRACTITIONER

## 2025-05-22 PROCEDURE — 82465 ASSAY BLD/SERUM CHOLESTEROL: CPT

## 2025-05-22 PROCEDURE — 83036 HEMOGLOBIN GLYCOSYLATED A1C: CPT

## 2025-05-22 RX ORDER — LEVONORGESTREL/ETHIN.ESTRADIOL 0.1-0.02MG
1 TABLET ORAL DAILY
Qty: 84 TABLET | Refills: 3 | Status: SHIPPED | OUTPATIENT
Start: 2025-05-22

## 2025-05-22 RX ORDER — ESTRADIOL 0.1 MG/G
1 CREAM VAGINAL
Qty: 42.5 G | Refills: 1 | Status: SHIPPED | OUTPATIENT
Start: 2025-05-22

## 2025-05-22 RX ORDER — ESTRADIOL 0.1 MG/G
CREAM VAGINAL
COMMUNITY
Start: 2025-04-02

## 2025-05-22 ASSESSMENT — ANXIETY QUESTIONNAIRES
GAD7 TOTAL SCORE: 2
GAD7 TOTAL SCORE: 2
3. WORRYING TOO MUCH ABOUT DIFFERENT THINGS: NOT AT ALL
1. FEELING NERVOUS, ANXIOUS, OR ON EDGE: NOT AT ALL
7. FEELING AFRAID AS IF SOMETHING AWFUL MIGHT HAPPEN: NOT AT ALL
5. BEING SO RESTLESS THAT IT IS HARD TO SIT STILL: NOT AT ALL
GAD7 TOTAL SCORE: 2
IF YOU CHECKED OFF ANY PROBLEMS ON THIS QUESTIONNAIRE, HOW DIFFICULT HAVE THESE PROBLEMS MADE IT FOR YOU TO DO YOUR WORK, TAKE CARE OF THINGS AT HOME, OR GET ALONG WITH OTHER PEOPLE: NOT DIFFICULT AT ALL
6. BECOMING EASILY ANNOYED OR IRRITABLE: SEVERAL DAYS
4. TROUBLE RELAXING: SEVERAL DAYS
8. IF YOU CHECKED OFF ANY PROBLEMS, HOW DIFFICULT HAVE THESE MADE IT FOR YOU TO DO YOUR WORK, TAKE CARE OF THINGS AT HOME, OR GET ALONG WITH OTHER PEOPLE?: NOT DIFFICULT AT ALL
7. FEELING AFRAID AS IF SOMETHING AWFUL MIGHT HAPPEN: NOT AT ALL
2. NOT BEING ABLE TO STOP OR CONTROL WORRYING: NOT AT ALL

## 2025-05-22 NOTE — LETTER
"2025       RE: Nisreen Mohan  [address] [facility] [address]     Dear Colleague,    Thank you for referring your patient, Nisreen Mohan, to the [facility] [place] at [facility]. Please see a copy of my visit note below.      Progress Note    SUBJECTIVE:  Nisreen Mohan is an 45 year old, , who requests an Annual Preventive Exam.     Concerns today include:   Menstrual periods: Mirena IUD was removed 3/20/2025 due to being . Period prior to this was 2025. Then next period was 2025 (exact date). She started COCs in March to help manage her perimenopausal symptoms.  Bleeding in May occurred during the withdrawal / placebo week. Denies heavy or prolonged bleeding. Wondering what to expect for menses going forward  Mental health: Restarted Sertraline 50mg after her last appointment. This has been helping with mood stability.   Perimenopausal symptoms: Started COCs to help with perimenopausal symptoms in 2025. Skin continues to feel dryer, but not as \"aggressive.\" Feels alleviated a little bit. Night sweats had been occurring up to 5 per night and these have resolved. Brain fog: improved; Vaginal dryness: improved; Nisreen has also started a vaginal estrogen cream which she purchased from an [employer] company - she is unsure of the dosage; has found this helpful. Feeling more like herself. Happy with this management plan.   Trouble losing weight: Up about 20lbs from when she had her children; desires to get back to 130-135lb, as she feels better at this weight. Walks her dog about 3 miles most mornings; doing a weight lifting program at home as well. Trying to log her food. Prioritizing protein and fiber.   Fam hx of cancer: Nisreen has a significant family history of cancer (mother- breast cancer in her early 50s; paternal aunt had stage 4 ovarian cancer; father had prostate and colorectal cancer).  Her " sister did genetic testing and it was negative.  Nisreen did genetic counseling within the last year and opted not to pursue genetic testing.      Due for lipid profile    Last pap smear: 2021 NIL, HPV negative  - No hx of abnormal pap smears    Last mammogram: 8/2023 - DUE    Colonoscopy: 2/2024 --> repeat in 5 yrs (due 2029)    Due for Tdap in 7/2026    Sexual hx: feeling well; no concerns    Answers submitted by the patient for this visit:  Patient Health Questionnaire (G7) (Submitted on 5/22/2025)  YINA 7 TOTAL SCORE: 2    Menstrual History:      2/2/2024     6:08 AM 3/20/2025     8:20 AM 5/22/2025     9:00 AM   Menstrual History   LAST MENSTRUAL PERIOD 1/19/2024 1/17/2025 5/6/2025     Last Mammogram:   MA Screening Digital Bilateral  Result Date: 8/28/2023  Narrative: BILATERAL FULL FIELD DIGITAL SCREENING MAMMOGRAM Performed on: 8/28/23 Compared to: 04/07/2022 and 02/16/2021 Technique: This study was evaluated with the assistance of Computer-Aided Detection. Findings: The breasts are extremely dense, which lowers the sensitivity of mammography.  There is no radiographic evidence of malignancy.     MA Screening Digital Bilateral  Result Date: 4/7/2022  Narrative: BILATERAL FULL FIELD DIGITAL SCREENING MAMMOGRAM Performed on: 4/7/22 Compared to: 02/16/2021 Technique: This study was evaluated with the assistance of Computer-Aided Detection. Findings: The breasts are extremely dense, which lowers the sensitivity of mammography.  There is no radiographic evidence of malignancy. IMPRESSION: ACR BI-RADS Category 1: Negative RECOMMENDED FOLLOW-UP: Annual routine screening mammogram The results and recommendations of this examination will be communicated to the patient.        Last Colonoscopy:  Results for orders placed or performed during the hospital encounter of 02/02/24   COLONOSCOPY    Collection Time: 02/02/24  7:13 AM   Result Value Ref Range    COLONOSCOPY       [facility]  [address]  [place] [address] [phone]     Endoscopy Department  _______________________________________________________________________________  Patient Name: Nisreen Mohan          Procedure Date: 2/2/2024 7:13 AM  MRN: [id number]                       Account Number: [id number]  YOB: 1980              Admit Type: Outpatient  Age: 43                               Room: [place] [address]  Gender: Female                        Note Status: Finalized  Attending MD: NARGIS HERNANDEZ MD,   Total Sedation Time:   _______________________________________________________________________________     Procedure:             Colonoscopy  Indications:           Screening patient at increased risk: Family history of                          1st-degree relative with colorectal cancer at age 60                          years (or older)  Providers:             NARGIS HERNANDEZ MD, Jalil Gauthier RN  Referring MD:           GALEN DEVI  Medicines:             Fentanyl 100 micrograms IV, Midazolam 2 mg IV  Complications:         No immediate complications.  _______________________________________________________________________________  Procedure:             Pre-Anesthesia Assessment:                         - Prior to the procedure, a History and Physical was                          performed, and patient medications and allergies were                          reviewed. The patient is competent. The risks and                          benefits of the procedure and the sedation options and                          risks were discussed with the patient. All questions                          were answered and informed consent was obtained.                          Patient identification and proposed procedure were                          verified by the physician and the nurse in the                          pre-procedure area in the endoscopy suite. Mental                           Status  Examination: alert and oriented. Airway                          Examination: normal oropharyngeal airway and neck                          mobility. Respiratory Examination: clear to                          auscultation. CV Examination: normal. Prophylactic                          Antibiotics: The patient does not require prophylactic                          antibiotics. Prior Anticoagulants: The patient has                          taken no anticoagulant or antiplatelet agents. ASA                          Grade Assessment: II - A patient with mild systemic                          disease. After reviewing the risks and benefits, the                          patient was deemed in satisfactory condition to                          undergo the procedure. The anesthesia plan was to use                          moderate sedation / analgesia (conscious sedation).                          Immediately prior to administration of medications,                          the patient was re-as sessed for adequacy to receive                          sedatives. The heart rate, respiratory rate, oxygen                          saturations, blood pressure, adequacy of pulmonary                          ventilation, and response to care were monitored                          throughout the procedure. The physical status of the                          patient was re-assessed after the procedure.                         After obtaining informed consent, the colonoscope was                          passed under direct vision. Throughout the procedure,                          the patient's blood pressure, pulse, and oxygen                          saturations were monitored continuously. The                          Colonoscope was introduced through the anus and                          advanced to the cecum, identified by appendiceal                          orifice and ileocecal valve. The colonoscopy was                           performed without difficulty. The patient tolera ricardo                          the procedure well. The quality of the bowel                          preparation was evaluated using the BBPS ([place] Bowel                          Preparation Scale) with scores of: Right Colon = 3,                          Transverse Colon = 3 and Left Colon = 3 (entire mucosa                          seen well with no residual staining, small fragments                          of stool or opaque liquid). The total BBPS score                          equals 9.                                                                                   Findings:       The perianal and digital rectal examinations were normal.       A 7 mm polyp was found in the cecum. The polyp was sessile. The polyp        was removed with a cold snare. Resection and retrieval were complete.        Verification of patient identification for the specimen was done.        Estimated blood loss was minimal.       The exam was otherwise normal throughout the examined colon.       The re troflexed view of the distal rectum and anal verge was normal and        showed no anal or rectal abnormalities.                                                                                   Impression:            - One 7 mm polyp in the cecum, removed with a cold                          snare. Resected and retrieved.                         - The distal rectum and anal verge are normal on                          retroflexion view.  Recommendation:        - Discharge patient to home (ambulatory).                         - Patient has a contact number available for                          emergencies. The signs and symptoms of potential                          delayed complications were discussed with the patient.                          Return to normal activities tomorrow. Written                          discharge instructions were provided to the patient.                          - Await pathology results.                         - Repeat colonoscopy in 5 years for surveilla nce based                          on pathology results.                         - Return to primary care physician.                                                                                       _____________________________  NARGIS HERNANDEZ MD  2024 8:07:28 AM  I was physically present for the entire viewing portion of the exam.  __________________________  Signature of teaching physician  [id number] HERNANDEZ MD  Number of Addenda: 0    Note Initiated On: 2024 7:13 AM  Scope In: 7:43:02 AM  Scope Out: 8:05:14 AM           HISTORY:  Current Outpatient Medications   Medication Sig Dispense Refill     estradiol (ESTRACE) 0.1 MG/GM vaginal cream Place vaginally.       estradiol (ESTRACE) 0.1 MG/GM vaginal cream Place 1 g vaginally twice a week. 42.5 g 1     levonorgestrel-ethinyl estradiol (AVIANE) 0.1-20 MG-MCG tablet Take 1 tablet by mouth daily. 84 tablet 3     sertraline (ZOLOFT) 50 MG tablet Take 1 tablet (50 mg) by mouth daily. 90 tablet 3     VITAMIN D, CHOLECALCIFEROL, PO Take by mouth daily.       No current facility-administered medications for this visit.     No Known Allergies  Immunization History   Administered Date(s) Administered     COVID-19 12+ (MODERNA) 10/29/2024     COVID-19 12+ (Pfizer) 10/09/2023     COVID-19 Bivalent 12+ (Pfizer) 2022     COVID-19 MONOVALENT 12+ (Pfizer) 2021, 2021, 2021     Influenza (IIV3) PF 2012     Influenza Vaccine >6 months,quad, PF 10/20/2016, 2021     TDAP Vaccine (Boostrix) 11/15/2012, 2016       OB History    Para Term  AB Living   3 2 2 0 1 2   SAB IAB Ectopic Multiple Live Births   0 0 0 0 2     Past Medical History:   Diagnosis Date     Anxiety      Depression      Menarche age    Cycles Q     Uncomplicated asthma      Past Surgical History:   Procedure Laterality  Date     COLONOSCOPY N/A 2/2/2024    Procedure: COLONOSCOPY, WITH POLYPECTOMY AND BIOPSY;  Surgeon: Nav Harp MD;  Location: [facility]     HC TOOTH EXTRACTION W/FORCEP      no troubles with sedation     Family History   Problem Relation Age of Onset     Breast Cancer Mother         lymphectomy on right side     Cancer Father 63        colon, rectal     Hypertension Father         takes medication     Prostate Cancer Father      Cardiovascular Maternal Grandmother         quad bypass     Cerebrovascular Disease Paternal Grandfather      Ovarian Cancer Paternal Aunt 68        stage 4     Other Cancer Other         Stage 4 Ovarian Cancer     Social History     Socioeconomic History     Marital status:      Spouse name: Grayson     Number of children: 1     Years of education: None     Highest education level: None   Occupational History     Occupation:      Comment: full time   Tobacco Use     Smoking status: Never     Smokeless tobacco: Never   Vaping Use     Vaping status: Never Used   Substance and Sexual Activity     Alcohol use: Yes     Alcohol/week: 2.0 - 3.0 standard drinks of alcohol     Comment: 1 beer or wine per day 3-5 nights a week     Drug use: No     Sexual activity: Yes     Partners: Male     Birth control/protection: I.U.D., None     Comment: Paragard   Other Topics Concern      Service No     Blood Transfusions No     Caffeine Concern No     Occupational Exposure No     Hobby Hazards No     Sleep Concern Yes     Comment: trouble staying asleep, taking sleep      Stress Concern Yes     Comment: new job, considering marriage, not feeling overwhelmed     Weight Concern No     Special Diet No     Back Care No     Exercise Yes     Comment: runner, cross-fit       Bike Helmet Yes     Seat Belt Yes     Self-Exams Yes   Social History Narrative    How much exercise per week? 3 days    How much calcium per day? 4-5 servings       How much caffeine per day? 1 cup  "coffee    How much vitamin D per day?  No supplements    Do you/your family wear seatbelts?  Yes    Do you/your family use safety helmets? Yes    Do you/your family use sunscreen? Yes    Do you/your family keep firearms in the home? No    Do you/your family have a smoke detector(s)? Yes        Do you feel safe in your home? Yes    Has anyone ever touched you in an unwanted manner? No     Explain         December 22, 2014 Kaleb Betancourt LPN    reveiewed ryanjoshshayy moreno lpn 1-         Social Drivers of Health     Interpersonal Safety: Not At Risk (8/24/2023)    Humiliation, Afraid, Rape, and Kick questionnaire      Fear of Current or Ex-Partner: No      Emotionally Abused: No      Physically Abused: No      Sexually Abused: No       ROS  [unfilled]      10/20/2016    10:23 AM 2/16/2021    11:16 AM 8/24/2023    11:36 AM   PHQ-9 SCORE   PHQ-9 Total Score 1  2 2       Data saved with a previous flowsheet row definition         2/12/2021     4:51 PM 8/24/2023    11:36 AM 5/22/2025     8:53 AM   YINA-7 SCORE   Total Score 3 (minimal anxiety)  2 (minimal anxiety)   Total Score 3 0 2        Patient-reported         EXAM:  Blood pressure 137/88, pulse 73, height 1.638 m (5' 4.5\"), weight 70.1 kg (154 lb 9.6 oz), last menstrual period 05/06/2025, not currently breastfeeding. Body mass index is 26.13 kg/m .  General - pleasant female in no acute distress.  Skin - no suspicious lesions or rashes  EENT-  euthyroid with out palpable nodules  Neck - supple without lymphadenopathy.  Lungs - clear to auscultation bilaterally.  Heart - regular rate and rhythm without murmur.  Abdomen - soft, nontender, nondistended, no masses or organomegaly noted.  Musculoskeletal - no gross deformities.  Neurological - normal strength, sensation, and mental status.    Breast Exam:  Breast: Without visible skin changes. No dimpling or lesions seen.   Breasts supple, non-tender with palpation, no dominant mass, nodularity, or nipple discharge noted " bilaterally. Axillary nodes negative.        ASSESSMENT/ PLAN:  1. Visit for preventive health examination (Primary)  - Lipid Profile; Future  - TSH with free T4 reflex; Future  - Hemoglobin A1c; Future  - MA Screening Bilateral w/ Robin; Future  - Pap smear due 2026  - Colonoscopy due 2029   - Tdap due 2026    2. Screening for lipid disorders  - Lipid Profile; Future    3. Screening for diabetes mellitus  - Hemoglobin A1c; Future    4. Encounter for screening mammogram for malignant neoplasm of breast  5. Family history of breast cancer  - MA Screening Bilateral w/ Robin; Future  Recommended Nisreen re-connect with the genetic counselor she met with to share that she does not desire to pursue genetic testing at this time and ask for risk calculations and subsequent screening recommendations. Discussed that she may qualify for high risk breast cancer screening, for example, with MRI and Mammogram alternating every 6 months.      6. Screening for thyroid disorder  - TSH with free T4 reflex; Future    7. Panic attack  - sertraline (ZOLOFT) 50 MG tablet; Take 1 tablet (50 mg) by mouth daily.  Dispense: 90 tablet; Refill: 3    8. Surveillance of previously prescribed contraceptive pill  - levonorgestrel-ethinyl estradiol (AVIANE) 0.1-20 MG-MCG tablet; Take 1 tablet by mouth daily.  Dispense: 84 tablet; Refill: 3  Counseled on common bleeding patterns in the perimenopausal time period while taking COCs.  - Recommend BP monitoring given elevation today after starting COCs in March 2025.    9. Vaginal dryness  - estradiol (ESTRACE) 0.1 MG/GM vaginal cream; Place 1 g vaginally twice a week.  Dispense: 42.5 g; Refill: 1  Nisreen prefers to switch her vaginal estrogen to this prescriber so that all of her hormones are being managed together.     Additional teaching done at this visit regarding self breast awareness, exercise, perimenopause, mental health, and weight/diet.    Return to clinic in one year.  Follow-up as  needed.    Philomena Ochoa, AGUILAR, APRN, WHNP                    Again, thank you for allowing me to participate in the care of your patient.      Sincerely,    CARSON Valverde CNP

## 2025-06-09 ENCOUNTER — ANCILLARY PROCEDURE (OUTPATIENT)
Dept: MAMMOGRAPHY | Facility: CLINIC | Age: 45
End: 2025-06-09
Attending: NURSE PRACTITIONER
Payer: COMMERCIAL

## 2025-06-09 DIAGNOSIS — Z00.00 VISIT FOR PREVENTIVE HEALTH EXAMINATION: ICD-10-CM

## 2025-06-09 DIAGNOSIS — Z12.31 ENCOUNTER FOR SCREENING MAMMOGRAM FOR MALIGNANT NEOPLASM OF BREAST: ICD-10-CM

## 2025-06-09 PROCEDURE — 77067 SCR MAMMO BI INCL CAD: CPT | Mod: 26 | Performed by: STUDENT IN AN ORGANIZED HEALTH CARE EDUCATION/TRAINING PROGRAM

## 2025-06-09 PROCEDURE — 77063 BREAST TOMOSYNTHESIS BI: CPT

## 2025-06-09 PROCEDURE — 77063 BREAST TOMOSYNTHESIS BI: CPT | Mod: 26 | Performed by: STUDENT IN AN ORGANIZED HEALTH CARE EDUCATION/TRAINING PROGRAM

## (undated) RX ORDER — FENTANYL CITRATE 50 UG/ML
INJECTION, SOLUTION INTRAMUSCULAR; INTRAVENOUS
Status: DISPENSED
Start: 2024-02-02

## (undated) RX ORDER — SIMETHICONE 40MG/0.6ML
SUSPENSION, DROPS(FINAL DOSAGE FORM)(ML) ORAL
Status: DISPENSED
Start: 2024-02-02